# Patient Record
Sex: FEMALE | Race: WHITE | NOT HISPANIC OR LATINO | Employment: FULL TIME | URBAN - METROPOLITAN AREA
[De-identification: names, ages, dates, MRNs, and addresses within clinical notes are randomized per-mention and may not be internally consistent; named-entity substitution may affect disease eponyms.]

---

## 2017-01-05 ENCOUNTER — PATIENT MESSAGE (OUTPATIENT)
Dept: ADMINISTRATIVE | Facility: OTHER | Age: 32
End: 2017-01-05

## 2017-01-23 ENCOUNTER — TELEPHONE (OUTPATIENT)
Dept: OPHTHALMOLOGY | Facility: CLINIC | Age: 32
End: 2017-01-23

## 2017-01-23 NOTE — TELEPHONE ENCOUNTER
----- Message from Sera Patrick sent at 1/23/2017 11:35 AM CST -----  Contact: Patient  Patient called and wanted to schedule a Lasik consult with Dr. Alvarez.     Please call her about this at [phone]

## 2017-01-24 ENCOUNTER — TELEPHONE (OUTPATIENT)
Dept: INTERNAL MEDICINE | Facility: CLINIC | Age: 32
End: 2017-01-24

## 2017-01-24 NOTE — TELEPHONE ENCOUNTER
"----- Message from Ashley Gilbert sent at 1/23/2017 11:31 AM CST -----  Contact: Self  Pt is calling to Schedule an appt as a NP and to establish care.   unable to make appt due to "No Scheduling" instructions on the panel.    She can be reached at [phone].    Thank You.  "

## 2017-01-24 NOTE — TELEPHONE ENCOUNTER
Pt wanted to scheduled appt with Dr. Barraza and cancelled with Dr. Bauman/ appt scheduled per pt's preference no further questions/concerns at this time

## 2017-01-31 ENCOUNTER — PATIENT OUTREACH (OUTPATIENT)
Dept: ADMINISTRATIVE | Facility: HOSPITAL | Age: 32
End: 2017-01-31

## 2017-01-31 RX ORDER — CITALOPRAM 40 MG/1
TABLET, FILM COATED ORAL
Refills: 5 | COMMUNITY
Start: 2016-12-27 | End: 2023-05-16

## 2017-01-31 RX ORDER — VALACYCLOVIR HYDROCHLORIDE 1 G/1
TABLET, FILM COATED ORAL
Refills: 1 | COMMUNITY
Start: 2016-11-07

## 2017-02-03 ENCOUNTER — PATIENT MESSAGE (OUTPATIENT)
Dept: ADMINISTRATIVE | Facility: HOSPITAL | Age: 32
End: 2017-02-03

## 2017-02-14 ENCOUNTER — OFFICE VISIT (OUTPATIENT)
Dept: INTERNAL MEDICINE | Facility: CLINIC | Age: 32
End: 2017-02-14
Attending: INTERNAL MEDICINE
Payer: COMMERCIAL

## 2017-02-14 VITALS
HEIGHT: 68 IN | SYSTOLIC BLOOD PRESSURE: 102 MMHG | WEIGHT: 189.13 LBS | BODY MASS INDEX: 28.66 KG/M2 | OXYGEN SATURATION: 96 % | HEART RATE: 75 BPM | DIASTOLIC BLOOD PRESSURE: 82 MMHG

## 2017-02-14 DIAGNOSIS — G43.109 MIGRAINE WITH AURA AND WITHOUT STATUS MIGRAINOSUS, NOT INTRACTABLE: ICD-10-CM

## 2017-02-14 DIAGNOSIS — R06.83 SNORING: ICD-10-CM

## 2017-02-14 DIAGNOSIS — R53.83 FATIGUE, UNSPECIFIED TYPE: ICD-10-CM

## 2017-02-14 DIAGNOSIS — Z00.00 ANNUAL PHYSICAL EXAM: Primary | ICD-10-CM

## 2017-02-14 DIAGNOSIS — F33.41 RECURRENT MAJOR DEPRESSIVE DISORDER, IN PARTIAL REMISSION: ICD-10-CM

## 2017-02-14 PROCEDURE — 99999 PR PBB SHADOW E&M-EST. PATIENT-LVL IV: CPT | Mod: PBBFAC,,, | Performed by: INTERNAL MEDICINE

## 2017-02-14 PROCEDURE — 90471 IMMUNIZATION ADMIN: CPT | Mod: S$GLB,,, | Performed by: INTERNAL MEDICINE

## 2017-02-14 PROCEDURE — 99385 PREV VISIT NEW AGE 18-39: CPT | Mod: 25,S$GLB,, | Performed by: INTERNAL MEDICINE

## 2017-02-14 PROCEDURE — 90715 TDAP VACCINE 7 YRS/> IM: CPT | Mod: S$GLB,,, | Performed by: INTERNAL MEDICINE

## 2017-02-14 RX ORDER — LANOLIN ALCOHOL/MO/W.PET/CERES
400 CREAM (GRAM) TOPICAL DAILY
Refills: 0 | COMMUNITY
Start: 2017-02-14 | End: 2022-06-26

## 2017-02-14 NOTE — PROGRESS NOTES
Subjective:       Patient ID: Milvia Baker is a 31 y.o. female.    Chief Complaint: Annual Exam (est care)    HPI     Pt here to est care and for annual exam; prior pcp was at [facility]  Hx of migraine hx x 1 year - has rx for triptan Rx in past by prior pcp - this was helpful. Describes as constant pressure over ant head and behind eyes.     Pt had eval for MS in past after lost vision in right eye per pt many years ago - work up neg including MRI and lumbar puncture - was not having HA at that time.   No significant sinus congestion currently, no runny nose, no significant PND.   No worsening factors.   Alleviating factors include   Recently had annual eye exam as wears glasses - glass Rx was stable.     Taking celexa for anxiety and depression  + fatigue. + inc sleep. Feels sad at times - feels as though has been lifelong struggle and not worse. Not tearful. Attended counseling in past - reports saw psychiatrist in high school  Started on celexa a few years ago - this was inc from 20 to 40mg about 4 months ago by prior pcp  + snoring intermittently    Does have Nexplanon in place  F/u with gyn outside of [facility]    Review of Systems   Constitutional: Positive for fatigue. Negative for chills and fever.   HENT: Negative for rhinorrhea and sore throat.    Eyes: Negative for pain and visual disturbance.   Respiratory: Negative for cough and shortness of breath.    Cardiovascular: Negative for chest pain and leg swelling.   Gastrointestinal: Negative for abdominal pain and diarrhea.   Endocrine: Negative for cold intolerance and heat intolerance.   Genitourinary: Negative for dysuria and hematuria.   Musculoskeletal: Negative for arthralgias and joint swelling.   Skin: Negative for color change and rash.   Neurological: Negative for dizziness and headaches.   Hematological: Negative for adenopathy. Does not bruise/bleed easily.   Psychiatric/Behavioral: Negative for sleep disturbance. The patient is not nervous/anxious.         Objective:      Physical Exam   Constitutional: She is oriented to person, place, and time. She appears well-developed and well-nourished.   HENT:   Head: Normocephalic and atraumatic.   Right Ear: External ear normal.   Left Ear: External ear normal.   Nose: Nose normal.   Mouth/Throat: Oropharynx is clear and moist. No oropharyngeal exudate.   No carotid bruits   Eyes: Conjunctivae and EOM are normal.   Neck: Neck supple. No thyromegaly present.   Cardiovascular: Normal rate, regular rhythm, normal heart sounds and intact distal pulses.    Pulmonary/Chest: Effort normal and breath sounds normal.   Abdominal: Soft. Bowel sounds are normal.   Musculoskeletal: She exhibits no edema or tenderness.   Lymphadenopathy:     She has no cervical adenopathy.   Neurological: She is alert and oriented to person, place, and time. Coordination normal.   Skin: Skin is warm and dry.   Psychiatric: She has a normal mood and affect. Her behavior is normal. Judgment and thought content normal.       Assessment:       Annual physical exam  -     Tdap Vaccine (Adult)  -     CBC auto differential; Future; Expected date: 2/14/17  -     Comprehensive metabolic panel; Future; Expected date: 2/14/17  -     TSH; Future; Expected date: 2/14/17  -     Lipid panel; Future; Expected date: 2/14/17  -     Vitamin D; Future; Expected date: 2/14/17  -     Vitamin B12; Future; Expected date: 2/14/17  -     Folate; Future; Expected date: 2/14/17  -     Ferritin; Future; Expected date: 2/14/17  Recommend daily sunscreen, cardiovascular exercise min 30 min 5 days per week. Seatbelts routinely.    Fatigue, unspecified type: suspect multifactorial, rec graded exercise program. Refer to sleep for eval of snoring  -     Vitamin D; Future; Expected date: 2/14/17  -     Vitamin B12; Future; Expected date: 2/14/17  -     Folate; Future; Expected date: 2/14/17  -     Ferritin; Future; Expected date: 2/14/17    Migraine with aura and without status  migrainosus, not intractable: refer to neuro for eval, rec keep headache diary. Start mag supplement nightly  -     Ambulatory Referral to Neurology  -     magnesium oxide (MAG-OX) 400 mg tablet; Take 1 tablet (400 mg total) by mouth once daily.; Refill: 0    Snoring  -     Ambulatory consult to Sleep Disorders    Recurrent major depressive disorder, in partial remission: cont current med - discussed possibly switching to another ssri - will refer to counseling, start exercise program, eval by sleep for snoring and fatigue. Will rtc in 3 months for f/u - will consider med change at that time as reports no improvement with inc in current med 4 months ago  -     Ambulatory Referral to Psychology

## 2017-02-14 NOTE — PATIENT INSTRUCTIONS
Your test results will be communicated to you via: [facility], Telephone or Letter.  If you have not received your test results within one week. Please contact the clinic at [phone].    Please contact Sleep clinic to schedule your appointment at [phone]    Psychiatry department number [phone] or Dr. Kiersten Mendenhall MD [phone] and Dr. Delbert Gray MD [phone]

## 2017-02-14 NOTE — MR AVS SNAPSHOT
[facility]  [address]  [place] [address]  Phone: [phone]  Fax: [phone]                  Milvia Baker   2017 12:40 PM   Office Visit    Description:  Female : 1985   Provider:  Rach Barraza MD   Department:  [facility]           Reason for Visit     Annual Exam           Diagnoses this Visit        Comments    Preventative health care    -  Primary     Pap smear for cervical cancer screening         Fatigue, unspecified type         Annual physical exam         Migraine with aura and without status migrainosus, not intractable         Snoring         Recurrent major depressive disorder, in partial remission                To Do List           Future Appointments        Provider Department Dept Phone    2/15/2017 1:20 PM Mila Stacy NP [facility] [phone]    2017 8:15 AM LAB, [facility] [phone]      Goals (5 Years of Data)     None      Follow-Up and Disposition     Return in about 3 months (around 2017), or if symptoms worsen or fail to improve.      PURCHASE these Medications (No prescription required)        Start End    magnesium oxide (MAG-OX) 400 mg tablet 2017     Sig - Route: Take 1 tablet (400 mg total) by mouth once daily. - Oral    Class: OTC      [facility] On Call     [facility] On [facility] Nurse Care Line -  Assistance  Registered nurses in the [facility] provide clinical advisement, health education, appointment booking, and other advisory services.  Call for this free service at [phone].             Medications           Message regarding Medications     Verify the changes and/or additions to your medication regime listed below are the same as discussed with your clinician today.  If any of these changes or additions are incorrect, please notify your healthcare provider.        START taking these NEW medications        Refills    magnesium  "oxide (MAG-OX) 400 mg tablet 0    Sig: Take 1 tablet (400 mg total) by mouth once daily.    Class: OTC    Route: Oral           Verify that the below list of medications is an accurate representation of the medications you are currently taking.  If none reported, the list may be blank. If incorrect, please contact your healthcare provider. Carry this list with you in case of emergency.           Current Medications     citalopram (CELEXA) 40 MG tablet TK 1 T PO D    valacyclovir (VALTREX) 1000 MG tablet TK 2 TS PO Q 12 H PRN FOR 1 DAY FOR FEVER BLISTER    magnesium oxide (MAG-OX) 400 mg tablet Take 1 tablet (400 mg total) by mouth once daily.           Clinical Reference Information           Your Vitals Were     BP Pulse Height Weight Last Period SpO2    102/82 (BP Location: Left arm, Patient Position: Sitting, BP Method: Manual) 75 5' 8" (1.727 m) 85.8 kg (189 lb 2.5 oz) 01/28/2017 96%    BMI                28.76 kg/m2          Blood Pressure          Most Recent Value    BP  102/82      Allergies as of 2/14/2017     No Known Allergies      Immunizations Administered on Date of Encounter - 2/14/2017     Name Date Dose VIS Date Route    TDAP 2/14/2017 0.5 mL 2/24/2015 Intramuscular      Orders Placed During Today's Visit      Normal Orders This Visit    Ambulatory consult to Sleep Disorders     Ambulatory Referral to Neurology     Ambulatory Referral to Psychology     Tdap Vaccine (Adult)     Future Labs/Procedures Expected by Expires    CBC auto differential  2/14/2017 2/14/2018    Comprehensive metabolic panel  2/14/2017 4/15/2018    Ferritin  2/14/2017 5/15/2017    Folate  2/14/2017 4/15/2018    Lipid panel  2/14/2017 2/14/2018    TSH  2/14/2017 2/14/2018    Vitamin B12  2/14/2017 5/15/2017    Vitamin D  2/14/2017 5/15/2017      Instructions    Your test results will be communicated to you via: [facility], Telephone or Letter.  If you have not received your test results within one week. Please contact the clinic " at [phone].    Please contact Sleep clinic to schedule your appointment at [phone]    Psychiatry department number [phone] or Dr. Kiersten Mendenhall MD [phone] and Dr. Delbert Gray MD [phone]       Language Assistance Services     ATTENTION: Language assistance services are available, free of charge. Please call [phone].      ATENCIÓN: Si habla yanetañol, tiene a easton disposición servicios gratuitos de asistencia lingüística. Llame al [phone].     CHÚ Ý: N?u b?n nói Ti?ng Vi?t, có các d?ch v? h? tr? ngôn ng? mi?n phí dành cho b?n. G?i s? [phone].         [facility] complies with applicable Federal civil rights laws and does not discriminate on the basis of race, color, national origin, age, disability, or sex.

## 2017-02-14 NOTE — PROGRESS NOTES
Patient given TDAP IM in the LD. Patient tolerated well and Band-Aid was applied. Lot#[id number] Exp:01/28/2019. Patient advised to wait in the lobby for 15 min to make sure no adverse reactions occur. Patient given VIS information sheet.Patient states verbal understanding and has no further questions.

## 2017-02-15 ENCOUNTER — TELEPHONE (OUTPATIENT)
Dept: SLEEP MEDICINE | Facility: CLINIC | Age: 32
End: 2017-02-15

## 2017-02-15 ENCOUNTER — OFFICE VISIT (OUTPATIENT)
Dept: NEUROLOGY | Facility: CLINIC | Age: 32
End: 2017-02-15
Attending: INTERNAL MEDICINE
Payer: COMMERCIAL

## 2017-02-15 VITALS
DIASTOLIC BLOOD PRESSURE: 64 MMHG | WEIGHT: 190.06 LBS | BODY MASS INDEX: 28.8 KG/M2 | SYSTOLIC BLOOD PRESSURE: 108 MMHG | HEIGHT: 68 IN | HEART RATE: 76 BPM

## 2017-02-15 DIAGNOSIS — G47.30 UNSPECIFIED SLEEP APNEA: Primary | ICD-10-CM

## 2017-02-15 DIAGNOSIS — G43.009 MIGRAINE WITHOUT AURA, WITHOUT MENTION OF INTRACTABLE MIGRAINE WITHOUT MENTION OF STATUS MIGRAINOSUS: ICD-10-CM

## 2017-02-15 DIAGNOSIS — M54.12 CERVICAL RADICULOPATHY: ICD-10-CM

## 2017-02-15 PROBLEM — G47.00 INSOMNIA WITH SLEEP APNEA, UNSPECIFIED: Status: ACTIVE | Noted: 2017-02-15

## 2017-02-15 PROCEDURE — 99999 PR PBB SHADOW E&M-EST. PATIENT-LVL IV: CPT | Mod: PBBFAC,,, | Performed by: NURSE PRACTITIONER

## 2017-02-15 PROCEDURE — 99205 OFFICE O/P NEW HI 60 MIN: CPT | Mod: S$GLB,,, | Performed by: NURSE PRACTITIONER

## 2017-02-15 RX ORDER — TOPIRAMATE SPINKLE 15 MG/1
15 CAPSULE ORAL DAILY PRN
Qty: 90 CAPSULE | Refills: 1 | Status: SHIPPED | OUTPATIENT
Start: 2017-02-15 | End: 2023-05-16

## 2017-02-15 RX ORDER — ONDANSETRON 8 MG/1
8 TABLET, ORALLY DISINTEGRATING ORAL EVERY 12 HOURS PRN
Qty: 30 TABLET | Refills: 3 | OUTPATIENT
Start: 2017-02-15 | End: 2022-06-26

## 2017-02-15 RX ORDER — SUMATRIPTAN SUCCINATE 100 MG/1
100 TABLET ORAL
Qty: 9 TABLET | Refills: 3 | Status: SHIPPED | OUTPATIENT
Start: 2017-02-15 | End: 2023-05-16

## 2017-02-15 RX ORDER — KETOPROFEN 75 MG/1
75 CAPSULE ORAL EVERY 8 HOURS PRN
Qty: 30 CAPSULE | Refills: 3 | OUTPATIENT
Start: 2017-02-15 | End: 2022-06-26

## 2017-02-15 NOTE — MR AVS SNAPSHOT
[facility]  [address]  [place] [address]  Phone: [phone]  Fax: [phone]                  Milvia Baker   2/15/2017 1:20 PM   Office Visit    Description:  Female : 1985   Provider:  Mila Stacy NP   Department:  [facility] - Neurology           Reason for Visit     Headache     Snoring           Diagnoses this Visit        Comments    Unspecified sleep apnea    -  Primary     Migraine without aura, without mention of intractable migraine without mention of status migrainosus                To Do List           Future Appointments        Provider Department Dept Phone    2017 8:15 AM LAB, [facility] [phone]      Goals (5 Years of Data)     None       These Medications        Disp Refills Start End    ketoprofen (ORUDIS) 75 MG capsule 30 capsule 3 2/15/2017     Take 1 capsule (75 mg total) by mouth every 8 (eight) hours as needed for Pain. - Oral    Pharmacy: [facility] [address] Ph #: [phone]       ondansetron (ZOFRAN-ODT) 8 MG TbDL 30 tablet 3 2/15/2017     Take 1 tablet (8 mg total) by mouth every 12 (twelve) hours as needed. - Oral    [address] [facility] [address] Ph #: [phone]       topiramate (TOPAMAX) 15 MG capsule 90 capsule 1 2/15/2017 3/17/2017    Take 1 capsule (15 mg total) by mouth daily as needed. 1 cap qhs x 1-2 wk, then 2 caps,then 3 qhs thereafter if symptoms remain unimproved. - Oral    [address] [facility] [address] Ph #: [phone]       sumatriptan (IMITREX) 100 MG tablet 9 tablet 3 2/15/2017 3/17/2017    Take 1 tablet (100 mg total) by mouth every 2 (two) hours as needed for Migraine (max 200mg/24h). - Oral    Pharmacy: [id number] - [place] [facility] [address] [facility] [facility] [address] [facility] Ph #:  [phone]         [facility]     [facility] On Call Nurse [facility] Line - 24/7 Assistance  Registered nurses in the [facility] Center provide clinical advisement, health education, appointment booking, and other advisory services.  Call for this free service at [phone].             Medications           Message regarding Medications     Verify the changes and/or additions to your medication regime listed below are the same as discussed with your clinician today.  If any of these changes or additions are incorrect, please notify your healthcare provider.        START taking these NEW medications        Refills    ketoprofen (ORUDIS) 75 MG capsule 3    Sig: Take 1 capsule (75 mg total) by mouth every 8 (eight) hours as needed for Pain.    Class: Normal    Route: Oral    ondansetron (ZOFRAN-ODT) 8 MG TbDL 3    Sig: Take 1 tablet (8 mg total) by mouth every 12 (twelve) hours as needed.    Class: Normal    Route: Oral    topiramate (TOPAMAX) 15 MG capsule 1    Sig: Take 1 capsule (15 mg total) by mouth daily as needed. 1 cap qhs x 1-2 wk, then 2 caps,then 3 qhs thereafter if symptoms remain unimproved.    Class: Normal    Route: Oral    sumatriptan (IMITREX) 100 MG tablet 3    Sig: Take 1 tablet (100 mg total) by mouth every 2 (two) hours as needed for Migraine (max 200mg/24h).    Class: Normal    Route: Oral           Verify that the below list of medications is an accurate representation of the medications you are currently taking.  If none reported, the list may be blank. If incorrect, please contact your healthcare provider. Carry this list with you in case of emergency.           Current Medications     citalopram (CELEXA) 40 MG tablet TK 1 T PO D    etonogestrel (NEXPLANON) 68 mg Impl by Subdermal route.    magnesium oxide (MAG-OX) 400 mg tablet Take 1 tablet (400 mg total) by mouth once daily.    valacyclovir (VALTREX) 1000 MG tablet TK 2 TS PO Q 12 H PRN FOR 1 DAY FOR FEVER BLISTER    ketoprofen  "(ORUDIS) 75 MG capsule Take 1 capsule (75 mg total) by mouth every 8 (eight) hours as needed for Pain.    ondansetron (ZOFRAN-ODT) 8 MG TbDL Take 1 tablet (8 mg total) by mouth every 12 (twelve) hours as needed.    sumatriptan (IMITREX) 100 MG tablet Take 1 tablet (100 mg total) by mouth every 2 (two) hours as needed for Migraine (max 200mg/24h).    topiramate (TOPAMAX) 15 MG capsule Take 1 capsule (15 mg total) by mouth daily as needed. 1 cap qhs x 1-2 wk, then 2 caps,then 3 qhs thereafter if symptoms remain unimproved.           Clinical Reference Information           Your Vitals Were     BP Pulse Height Weight Last Period BMI    108/64 76 5' 8" (1.727 m) 86.2 kg (190 lb 0.6 oz) 01/28/2017 28.89 kg/m2      Blood Pressure          Most Recent Value    BP  108/64      Allergies as of 2/15/2017     No Known Allergies      Immunizations Administered on Date of Encounter - 2/15/2017     None      Orders Placed During Today's Visit     Future Labs/Procedures Expected by Expires    Home Sleep Studies  As directed 2/15/2018      Instructions      Obstructive Sleep Apnea  Obstructive sleep apnea is a condition that causes your air passages to become narrowed or blocked during sleep. As a result, breathing stops for short periods. Your body wakes up enough for breathing to begin again, though you don't remember it. The cycle of stopped breathing and brief awakenings can repeat dozens of times a night. This prevents the body from getting to the deeper stages of sleep that are needed for good rest.  Signs of sleep apnea include loud snoring, noisy breathing, and gasping sounds during sleep. Daytime symptoms include waking up tired after a full night's sleep, waking up with headaches, feeling very sleepy or falling asleep during the day, and having problems with memory or concentration.  Risk factors for sleep apnea include:  · Being overweight  · Being a man, or a woman in menopause  · Smoking  · Using alcohol or sedating " medications or herbs  · Having enlarged structures in the nose or throat  Home care  Lifestyle changes that can help treat snoring and sleep apnea include the following:  · If you are overweight, lose weight. Talk to your healthcare provider about a weight-loss plan for you.  · Avoid alcohol for 3 to 4 hours before bedtime. Avoid sedating medications. Ask your healthcare provider about the medications you take.  · If you smoke, talk to your healthcare provider about ways to quit.  · Sleep on your side. This can help prevent gravity from pulling relaxed throat tissues into your breathing passages.  · If you have allergies or sinus problems that block your nose, ask your healthcare provider for help.  Follow up  Follow up with your healthcare provider as advised. A diagnosis of sleep apnea is made with a sleep study. Your healthcare provider can tell you more about this test.  When to seek medical care  Sleep apnea can make you more likely to have certain health problems. These include high blood pressure, heart attack, stroke, and sexual dysfunction. If you have sleep apnea, talk to your healthcare provider about the best treatments for you.  Date Last Reviewed: 5/3/2015  © 9329-0953 [employer]. [address] [place] [address]. All rights reserved. This information is not intended as a substitute for professional medical care. Always follow your healthcare professional's instructions.        Preventing Migraine Headaches: Triggers  The first step in preventing migraines is to learn what triggers them. You may then be able to control your triggers to avoid or reduce the severity of your migraines.     Know your triggers  Be aware that you may have more than one trigger, and that some triggers may work together. Common migraine triggers include:  · Food and nutrition. Skipping meals or not drinking enough water can trigger headaches. So can certain foods, such as caffeine, monosodium glutamate  (MSG), aged cheese, or sausage.  · Alcohol. Red wine and other alcoholic beverages are common migraine triggers.  · Chemicals. Scents, cleaning products, gasoline, glue, perfume, and paint can be triggers. So can tobacco smoke, including secondhand smoke.  · Emotions. Stress can trigger headaches or make them worse once they begin.  · Sleep disruption. Staying up late, sleeping late, and traveling across time zones can disrupt your sleep cycle, triggering headaches.  · Hormones. Many women notice that migraines tend to happen at a certain point in their menstrual cycle. Birth control pills or hormone replacement therapy may also trigger migraines.  · Environment and weather. Air travel, changes in altitude, air pressure changes, hot sun, or bright or flashing lights can be triggers.    Control your triggers  These are some of the things you can do to try to control triggers:  · Avoid triggers if you can. For example, stay clear of alcohol and foods that trigger your headaches. Use unscented household products. Keep regular sleep habits. Manage stress to help control emotional triggers.  · Change your behavior at times when triggers can't be avoided. For example, make sure to get enough rest and drink plenty of water while you're traveling. Make sure to carry a hat, sunglasses, and your medicines. Be alert for migraine symptoms, so you can treat a migraine early if it happens.  Date Last Reviewed: 10/9/2015  © 4344-6597 [employer]. [address] [place] [address], [place] [address]. All rights reserved. This information is not intended as a substitute for professional medical care. Always follow your healthcare professional's instructions.        Preventing Migraine Headaches: Medicines and Lifestyle Changes  A migraine is a type of severe headache. Having a migraine can be very painful. But there are steps you can take to help prevent migraines.    Medicines to help prevent migraines  · Your healthcare  provider may prescribe certain medicines to help prevent migraines. These medicines may need to be taken daily. Or they may only need to be taken at times when youre likely to have a migraine.  · Common medicines used to help prevent migraines include:  ¨ Triptans (serotonin receptor agonists)  ¨ Nonsteroidal anti-inflammatory drugs (available over-the-counter)  ¨ Beta-blockers  ¨ Anticonvulsants  ¨ Tricyclic antidepressants  ¨ Calcium channel blockers  ¨ Certain vitamins, minerals, and plant extracts  ¨ Botulinum toxin injection (Botox) for certain chronic migraines   ¨ CGRP (calcitonin gene-related peptide) agnonists are being reviewed by the Food and Drug Administration (FDA)  Lifestyle changes for long-term prevention  Here are some suggestions:  · Exercise. Regular exercise can help prevent migraines and improve your health. (If exercise triggers your migraines, talk to your healthcare provider.)  · Keep regular habits. Dont skip or delay meals. Drink plenty of water. And go to bed and get up at about the same time each day. This includes weekends.  · Try alternative treatments. These are treatments that do not involve the use of medicines or surgery. They may help relieve symptoms and prevent migraines. Some treatment options include biofeedback and acupuncture. Ask your healthcare provider to tell you more about these treatments if you have questions.  · Limit caffeine. You may find that caffeine helps relieve pain during an attack. But too much caffeine can also trigger migraines. So, limit the amount of caffeine you consume.  Date Last Reviewed: 10/11/2015  © 6387-8145 [employer]. [address] [place] [address], [place] [address]. All rights reserved. This information is not intended as a substitute for professional medical care. Always follow your healthcare professional's instructions.        What Are Migraine and Tension Headaches?  Although there are several types of headaches, migraine and  tension headaches affect the most people. When you have a headache, it isn't your brain that's hurting. Your head aches because nerves in the bones, blood vessels, meninges, and muscles of your head are irritated. These irritated nerves send pain signals to the brain, which identifies where you hurt and how bad the pain is.  Talk with your healthcare provider about a treatment plan that may help relieve pain and prevent future headaches.     What causes your headache?  The actual headache process is not yet understood. Only rarely are headaches a sign of a serious medical problem. Headache pain may be caused by abnormal interaction between the brain and the nerves and blood vessels in the head. Environmental stresses or certain foods and drinks may trigger headache pain.  What is referred pain?  Headache pain can be referred pain, which is pain that has its source in one place but is felt in another. For example, pain behind the eyes may actually be caused by tense muscles in the neck and shoulders. This means that the place that hurts may not be the part of the body that needs treatment.  Is it a migraine?  Migraine is a vascular headache that causes throbbing pain felt on one or both sides of the head. You may feel nauseated or vomit. This headache may also be preceded or associated with changes in sight (like seeing spots or flashes of light), ability to speak, or sensation (aura). There are a wide variety of environmental and food-related triggers for migraines. The pain may last for 4 to 72 hours. Afterward, you may feel shaky for a day or so. If this is the first time you experience these symptoms, you should immediately seek medical attention because you could be having a stroke.  Is it a tension headache?  This type of headache is usually a dull ache or a sensation of pressure on both sides of the head. It may be associated with pain or tension in the neck and shoulders. Depression, anxiety, and stress can  "cause a tension headache. The pain may not have a definite beginning or end. It may come and go, or seem never to go away.  When to call the healthcare provider  Call your healthcare provider for headaches that happen along with any of these symptoms:  · Sudden, severe headache that is different from your usual headache pain  · High fever along with a stiff neck  · Recurring headache in children   · Ongoing numbness or muscle weakness  · Loss of vision  · Pain following a head injury  · Convulsions, or a change in mental awareness  · A headache you would call "the worst headache you've ever had"   Date Last Reviewed: 9/14/2015 © 2000-2016 [employer]. [address] [place] [address], [place] [address]. All rights reserved. This information is not intended as a substitute for professional medical care. Always follow your healthcare professional's instructions.        Migraine Headache: Stages and Treatment  A migraine headache tends to progress in stages. Learning these stages can help you better understand what is happening. Then you can learn ways to reduce pain and relieve other symptoms. Methods for relieving your symptoms include self-care and medicines.  Migraine stages  Migraines tend to progress through 4 stages. Many people don't have all stages, and stages may differ with each headache:  · Prodrome. A few hours to a day or so before the headache, you may feel tired, (yawning many times), uneasy, or lal. You may also feel bloated or crave certain foods.  · Aura. Up to an hour before the headache starts, some migraine sufferers experience aura--flashing lights, blind spots, other vision problems, confusion, difficulty speaking, or other neurologic symptoms.  · Headache. Moderate to severe pain affects one side of the head and then can spread to both sides, often along with nausea. You may be highly sensitive to light, sound, and odors. Vomiting or diarrhea may also happen. This stage lasts 4 to 72 " "hours.  · Postdrome. After your headache ends, you may feel tired, achy, and "washed out." This may last for a day or so.    Self-care during a migraine  Here is what you can do:  · Use a cold compress. Wrap a thin cloth around a cold pack, a cold can of soda, or a bag of frozen vegetables. Apply this to your temple or other pain site.  · Drink fluids. If nausea makes it hard to drink, try sucking on ice.  · Rest. If possible, lie down. Try not to bend over, as this may increase your pain. Sometimes laying in a dark quiet room can help the migraine from being aggravated.    · Try caffeine. Some people find that drinking fluids with caffeine, such as coffee or tea, helps to lessen migraine pain.  Using medicines  Work with your healthcare provider to find the right medicines for you. Medicines for migraine may relieve pain (analgesics), relieve nausea, or attack the migraine's root causes (migraine-specific medicines).  Rebound headache  Taking analgesics each day, or even several times a week, may lead to more frequent and severe headaches. These are called rebound headaches. If you think you're having rebound headaches, tell your healthcare provider. He or she can help you safely decrease your medicine. Rebound caffeine withdrawal headaches can also happen.    Date Last Reviewed: 10/9/2015  © 0367-9473 [employer]. [address] [place] [address], [place] [address]. All rights reserved. This information is not intended as a substitute for professional medical care. Always follow your healthcare professional's instructions.      Sarah or Favian will contact you to schedule your sleep study. Their number is [phone] (ext 1). The [facility] is located on 7th floor of the [facility].    We will call you when the sleep study results are ready - if you have not heard from us by 2 weeks from the date of the study, please call [phone] (ext 2).    You are advised to abstain from driving should you " feel sleepy or drowsy.         Language Assistance Services     ATTENTION: Language assistance services are available, free of charge. Please call [phone].      ATENCIÓN: Si habla jones, tiene a easton disposición servicios gratuitos de asistencia lingüística. Llame al [phone].     CHÚ Ý: N?u b?n nói Ti?ng Vi?t, có các d?ch v? h? tr? ngôn ng? mi?n phí dành cho b?n. G?i s? [phone].         [facility] complies with applicable Federal civil rights laws and does not discriminate on the basis of race, color, national origin, age, disability, or sex.

## 2017-02-15 NOTE — LETTER
February 15, 2017      Rach Barraza MD  [address]  [place] [address]           [facility]  [address]  [place] [address]  Phone: [phone]  Fax: [phone]          Patient: Milvia Baker   MR Number: [id number]   YOB: 1985   Date of Visit: 2/15/2017       Dear Dr. Rach Barraza:    Thank you for referring Milvia Baker to me for evaluation. Attached you will find relevant portions of my assessment and plan of care.    If you have questions, please do not hesitate to call me. I look forward to following Milvia Baker along with you.    Sincerely,    Mila Stacy, NP    Enclosure  CC:  No Recipients    If you would like to receive this communication electronically, please contact [email or web address] or [phone] to request more information on [employer] Link access.    For providers and/or their staff who would like to refer a patient to [facility], please contact us through our one-stop-shop provider referral line, [facility], at [phone].    If you feel you have received this communication in error or would no longer like to receive these types of communications, please e-mail [email or web address]

## 2017-02-15 NOTE — PATIENT INSTRUCTIONS
Obstructive Sleep Apnea  Obstructive sleep apnea is a condition that causes your air passages to become narrowed or blocked during sleep. As a result, breathing stops for short periods. Your body wakes up enough for breathing to begin again, though you don't remember it. The cycle of stopped breathing and brief awakenings can repeat dozens of times a night. This prevents the body from getting to the deeper stages of sleep that are needed for good rest.  Signs of sleep apnea include loud snoring, noisy breathing, and gasping sounds during sleep. Daytime symptoms include waking up tired after a full night's sleep, waking up with headaches, feeling very sleepy or falling asleep during the day, and having problems with memory or concentration.  Risk factors for sleep apnea include:  · Being overweight  · Being a man, or a woman in menopause  · Smoking  · Using alcohol or sedating medications or herbs  · Having enlarged structures in the nose or throat  Home care  Lifestyle changes that can help treat snoring and sleep apnea include the following:  · If you are overweight, lose weight. Talk to your healthcare provider about a weight-loss plan for you.  · Avoid alcohol for 3 to 4 hours before bedtime. Avoid sedating medications. Ask your healthcare provider about the medications you take.  · If you smoke, talk to your healthcare provider about ways to quit.  · Sleep on your side. This can help prevent gravity from pulling relaxed throat tissues into your breathing passages.  · If you have allergies or sinus problems that block your nose, ask your healthcare provider for help.  Follow up  Follow up with your healthcare provider as advised. A diagnosis of sleep apnea is made with a sleep study. Your healthcare provider can tell you more about this test.  When to seek medical care  Sleep apnea can make you more likely to have certain health problems. These include high blood pressure, heart attack, stroke, and sexual  dysfunction. If you have sleep apnea, talk to your healthcare provider about the best treatments for you.  Date Last Reviewed: 5/3/2015  © 6473-1694 [employer]. [address] [place] [address], [place] [address]. All rights reserved. This information is not intended as a substitute for professional medical care. Always follow your healthcare professional's instructions.        Preventing Migraine Headaches: Triggers  The first step in preventing migraines is to learn what triggers them. You may then be able to control your triggers to avoid or reduce the severity of your migraines.     Know your triggers  Be aware that you may have more than one trigger, and that some triggers may work together. Common migraine triggers include:  · Food and nutrition. Skipping meals or not drinking enough water can trigger headaches. So can certain foods, such as caffeine, monosodium glutamate (MSG), aged cheese, or sausage.  · Alcohol. Red wine and other alcoholic beverages are common migraine triggers.  · Chemicals. Scents, cleaning products, gasoline, glue, perfume, and paint can be triggers. So can tobacco smoke, including secondhand smoke.  · Emotions. Stress can trigger headaches or make them worse once they begin.  · Sleep disruption. Staying up late, sleeping late, and traveling across time zones can disrupt your sleep cycle, triggering headaches.  · Hormones. Many women notice that migraines tend to happen at a certain point in their menstrual cycle. Birth control pills or hormone replacement therapy may also trigger migraines.  · Environment and weather. Air travel, changes in altitude, air pressure changes, hot sun, or bright or flashing lights can be triggers.    Control your triggers  These are some of the things you can do to try to control triggers:  · Avoid triggers if you can. For example, stay clear of alcohol and foods that trigger your headaches. Use unscented household products. Keep regular sleep habits.  Manage stress to help control emotional triggers.  · Change your behavior at times when triggers can't be avoided. For example, make sure to get enough rest and drink plenty of water while you're traveling. Make sure to carry a hat, sunglasses, and your medicines. Be alert for migraine symptoms, so you can treat a migraine early if it happens.  Date Last Reviewed: 10/9/2015  © 9291-1623 [employer]. [address] [place] [address], [place] [address]. All rights reserved. This information is not intended as a substitute for professional medical care. Always follow your healthcare professional's instructions.        Preventing Migraine Headaches: Medicines and Lifestyle Changes  A migraine is a type of severe headache. Having a migraine can be very painful. But there are steps you can take to help prevent migraines.    Medicines to help prevent migraines  · Your healthcare provider may prescribe certain medicines to help prevent migraines. These medicines may need to be taken daily. Or they may only need to be taken at times when youre likely to have a migraine.  · Common medicines used to help prevent migraines include:  ¨ Triptans (serotonin receptor agonists)  ¨ Nonsteroidal anti-inflammatory drugs (available over-the-counter)  ¨ Beta-blockers  ¨ Anticonvulsants  ¨ Tricyclic antidepressants  ¨ Calcium channel blockers  ¨ Certain vitamins, minerals, and plant extracts  ¨ Botulinum toxin injection (Botox) for certain chronic migraines   ¨ CGRP (calcitonin gene-related peptide) agnonists are being reviewed by the Food and Drug Administration (FDA)  Lifestyle changes for long-term prevention  Here are some suggestions:  · Exercise. Regular exercise can help prevent migraines and improve your health. (If exercise triggers your migraines, talk to your healthcare provider.)  · Keep regular habits. Dont skip or delay meals. Drink plenty of water. And go to bed and get up at about the same time each day. This  includes weekends.  · Try alternative treatments. These are treatments that do not involve the use of medicines or surgery. They may help relieve symptoms and prevent migraines. Some treatment options include biofeedback and acupuncture. Ask your healthcare provider to tell you more about these treatments if you have questions.  · Limit caffeine. You may find that caffeine helps relieve pain during an attack. But too much caffeine can also trigger migraines. So, limit the amount of caffeine you consume.  Date Last Reviewed: 10/11/2015  © 8265-6151 [employer]. [address] [place] [address], [place] [address]. All rights reserved. This information is not intended as a substitute for professional medical care. Always follow your healthcare professional's instructions.        What Are Migraine and Tension Headaches?  Although there are several types of headaches, migraine and tension headaches affect the most people. When you have a headache, it isn't your brain that's hurting. Your head aches because nerves in the bones, blood vessels, meninges, and muscles of your head are irritated. These irritated nerves send pain signals to the brain, which identifies where you hurt and how bad the pain is.  Talk with your healthcare provider about a treatment plan that may help relieve pain and prevent future headaches.     What causes your headache?  The actual headache process is not yet understood. Only rarely are headaches a sign of a serious medical problem. Headache pain may be caused by abnormal interaction between the brain and the nerves and blood vessels in the head. Environmental stresses or certain foods and drinks may trigger headache pain.  What is referred pain?  Headache pain can be referred pain, which is pain that has its source in one place but is felt in another. For example, pain behind the eyes may actually be caused by tense muscles in the neck and shoulders. This means that the place that hurts may  "not be the part of the body that needs treatment.  Is it a migraine?  Migraine is a vascular headache that causes throbbing pain felt on one or both sides of the head. You may feel nauseated or vomit. This headache may also be preceded or associated with changes in sight (like seeing spots or flashes of light), ability to speak, or sensation (aura). There are a wide variety of environmental and food-related triggers for migraines. The pain may last for 4 to 72 hours. Afterward, you may feel shaky for a day or so. If this is the first time you experience these symptoms, you should immediately seek medical attention because you could be having a stroke.  Is it a tension headache?  This type of headache is usually a dull ache or a sensation of pressure on both sides of the head. It may be associated with pain or tension in the neck and shoulders. Depression, anxiety, and stress can cause a tension headache. The pain may not have a definite beginning or end. It may come and go, or seem never to go away.  When to call the healthcare provider  Call your healthcare provider for headaches that happen along with any of these symptoms:  · Sudden, severe headache that is different from your usual headache pain  · High fever along with a stiff neck  · Recurring headache in children   · Ongoing numbness or muscle weakness  · Loss of vision  · Pain following a head injury  · Convulsions, or a change in mental awareness  · A headache you would call "the worst headache you've ever had"   Date Last Reviewed: 9/14/2015  © 5319-7839 [employer]. [address] [place] [address], [place] [address]. All rights reserved. This information is not intended as a substitute for professional medical care. Always follow your healthcare professional's instructions.        Migraine Headache: Stages and Treatment  A migraine headache tends to progress in stages. Learning these stages can help you better understand what is happening. Then you " "can learn ways to reduce pain and relieve other symptoms. Methods for relieving your symptoms include self-care and medicines.  Migraine stages  Migraines tend to progress through 4 stages. Many people don't have all stages, and stages may differ with each headache:  · Prodrome. A few hours to a day or so before the headache, you may feel tired, (yawning many times), uneasy, or lal. You may also feel bloated or crave certain foods.  · Aura. Up to an hour before the headache starts, some migraine sufferers experience aura--flashing lights, blind spots, other vision problems, confusion, difficulty speaking, or other neurologic symptoms.  · Headache. Moderate to severe pain affects one side of the head and then can spread to both sides, often along with nausea. You may be highly sensitive to light, sound, and odors. Vomiting or diarrhea may also happen. This stage lasts 4 to 72 hours.  · Postdrome. After your headache ends, you may feel tired, achy, and "washed out." This may last for a day or so.    Self-care during a migraine  Here is what you can do:  · Use a cold compress. Wrap a thin cloth around a cold pack, a cold can of soda, or a bag of frozen vegetables. Apply this to your temple or other pain site.  · Drink fluids. If nausea makes it hard to drink, try sucking on ice.  · Rest. If possible, lie down. Try not to bend over, as this may increase your pain. Sometimes laying in a dark quiet room can help the migraine from being aggravated.    · Try caffeine. Some people find that drinking fluids with caffeine, such as coffee or tea, helps to lessen migraine pain.  Using medicines  Work with your healthcare provider to find the right medicines for you. Medicines for migraine may relieve pain (analgesics), relieve nausea, or attack the migraine's root causes (migraine-specific medicines).  Rebound headache  Taking analgesics each day, or even several times a week, may lead to more frequent and severe headaches. " These are called rebound headaches. If you think you're having rebound headaches, tell your healthcare provider. He or she can help you safely decrease your medicine. Rebound caffeine withdrawal headaches can also happen.    Date Last Reviewed: 10/9/2015  © 2483-2548 [employer]. [address] [place] [address], [place] [address]. All rights reserved. This information is not intended as a substitute for professional medical care. Always follow your healthcare professional's instructions.      Sarah or Favian will contact you to schedule your sleep study. Their number is [phone] (ext 1). The [facility] is located on 7th floor of the [facility].    We will call you when the sleep study results are ready - if you have not heard from us by 2 weeks from the date of the study, please call [phone] (ext 2).    You are advised to abstain from driving should you feel sleepy or drowsy.

## 2017-02-15 NOTE — PROGRESS NOTES
"REFERRING PROVIDER: Dr. Rach Barraza    REASON FOR CONSULT: Headaches and Snoring    31/F with headaches and sleep disturbance. In  2009 she lost vision OD few min, took a yr to see neuro, recurred few times since then, nothing recent. MS workup was negative. Does not recall HA following episodes.  Began with headache 1 yr ago, describes as "daily, tension throughout head" posteriorly, tender to touch, eyes always feel droopy or heavy w or w/o headache. Reports as stabbing pain if more intense, + associated nausea, dizziness, imbalance. Continues to work. Denies photo/phonophobia, recent fever/chills, head injury, acute limb weakness. ~30-45min it lessens intensity w/o medication, just by ceasing work or rest. Sometimes takes Ibuprofen 3-4 tab which is helpful.  If pain happens at home on weekend, she lies around on couch or in bed. +anxiety "maybe I am creating all of this". Always nauseated. Some headaches are mild and others can linger days. Imitrex 100mg helps abort pain w/i 4hr (has not refilled since fall '16). Stress, prolonged computer use, bright lights are known triggers.  Activity can worsen pain, rest improve. Sees orbs of light/squiggly lines during headache, not preceding. Occasional ringing in ears or fullness. Takes NSAID once weekly, otherwise just tolerates headache.     +snoring, denies witnessed apneic pauses, wakes self air gasping or from snoring, 0-1x nocturia, un-refreshed from sleep "struggle to get up", daytime sleepiness "recently told by family member that she is always sleeping". She looks forward to sleep time. Sleeps on back and side.     Sporadic shooting pain left shoulder to fingers, occurs few times/month, 5-20s. Had attended PT in past.    Tried: Imitrex  Ophthalmology exam normal Nov '16  Claritin or benadryl prn for seasonal allergies/sinus    FH: Adopted so unknown  SH: Religious Adm, engaged no tobacco, social ETOH    HIT-6 score-62  ESS = ___    LABS pending " "2/16/17____    ROS: Denies double vision, frequent sinus congestion. +teeth grinding (seeing DDS 2/20), 45# gain/ 4 yrs, low mood/anxiety (SSRI), otherwise a balance review of 10-systems is negative.       PHYSICAL EXAM:   General: W/D, W/N, well groomed  Visit Vitals    /64    Pulse 76    Ht 5' 8" (1.727 m)    Wt 86.2 kg (190 lb 0.6 oz)    LMP 01/28/2017    BMI 28.89 kg/m2     Neurological: Awake, alert, oriented x 3. Speech fluent, no dysarthria. Good attention span. Good fund of knowledge.   CN II-XII- pupils equal, no ptosis, nystagmus, EOM palsy. No facial asymmetry or facial sensory loss. Good hearing bilaterally. Good shoulder shrug, palatal elevation, tongue protrusion bilaterally.   Motor: 5/5 strength, normal tone/bulk in all extremities.   Sensory: Intact to light touch upper and lower extremities. +bilateral temporal, occipital, trapezius and paracervical muscle tenderness.   Gait: Normal   Coordination: Good FTNT, Heel to shin   Modified mallampati II, long thick tongue coated/patchy erythema, normal palate, neck circumference 13.5"      IMPRESSION:   Chronic migraines w/o aura  Unspecified sleep apnea  Hx brief monocular vision loss w/o headache  Cervical radiculopathy    PLAN   Topamax 15mg qhs, with up-titration to 45mg qhs for improvement of headaches. Stay at lowest effective dose. Discussed purpose/se    Abortive therapy:   Resume Imitrex 100mg 1/2-1 tab PO q2h prn, max 200mg/24h, +- NSAID for improved effectiveness. Discussed potential s/e/purpose.   Ketoprofen 75mg PO q8h prn  Zofran ODT 8mg q12h prn nausea   Stop otc analgesics    MRI brain/cervical spine    Encouraged to continue to eat regular meals, get adequate sleep, avoid known triggers, and reduce stressors. Consider changing fluorescent lights in office/glare free screen    Home sleep test, discussed plan of care (plan myochnser). Discussed etiology of SUZETTE, potential implications of untreated sleep apnea.       RTC 2.5 " month, sooner if needed. Begin HA diaries for accurate monitoring (will do electronic)

## 2017-02-16 ENCOUNTER — HOSPITAL ENCOUNTER (OUTPATIENT)
Dept: RADIOLOGY | Facility: OTHER | Age: 32
Discharge: HOME | End: 2017-02-16
Attending: NURSE PRACTITIONER
Payer: COMMERCIAL

## 2017-02-16 DIAGNOSIS — G43.009 MIGRAINE WITHOUT AURA, WITHOUT MENTION OF INTRACTABLE MIGRAINE WITHOUT MENTION OF STATUS MIGRAINOSUS: ICD-10-CM

## 2017-02-16 PROCEDURE — 70553 MRI BRAIN STEM W/O & W/DYE: CPT | Mod: 26,,, | Performed by: RADIOLOGY

## 2017-02-16 PROCEDURE — A9585 GADOBUTROL INJECTION: HCPCS | Performed by: NURSE PRACTITIONER

## 2017-02-16 PROCEDURE — 70553 MRI BRAIN STEM W/O & W/DYE: CPT | Mod: TC

## 2017-02-16 PROCEDURE — 25500020 PHARM REV CODE 255: Performed by: NURSE PRACTITIONER

## 2017-02-16 RX ORDER — GADOBUTROL 604.72 MG/ML
8.5 INJECTION INTRAVENOUS
Status: COMPLETED | OUTPATIENT
Start: 2017-02-16 | End: 2017-02-16

## 2017-02-16 RX ADMIN — GADOBUTROL 8.5 ML: 604.72 INJECTION INTRAVENOUS at 09:02

## 2017-02-17 ENCOUNTER — PATIENT MESSAGE (OUTPATIENT)
Dept: NEUROLOGY | Facility: CLINIC | Age: 32
End: 2017-02-17

## 2017-02-18 ENCOUNTER — TELEPHONE (OUTPATIENT)
Dept: INTERNAL MEDICINE | Facility: CLINIC | Age: 32
End: 2017-02-18

## 2017-02-18 DIAGNOSIS — E55.9 VITAMIN D DEFICIENCY DISEASE: Primary | ICD-10-CM

## 2017-02-18 RX ORDER — ERGOCALCIFEROL 1.25 MG/1
50000 CAPSULE ORAL
Qty: 12 CAPSULE | Refills: 0 | Status: SHIPPED | OUTPATIENT
Start: 2017-02-18 | End: 2017-05-07

## 2017-02-18 NOTE — TELEPHONE ENCOUNTER
Message sent to pt via my chart with lab results and updates to plan.    please schedule vit d lab in 3 months

## 2017-02-24 ENCOUNTER — TELEPHONE (OUTPATIENT)
Dept: NEUROLOGY | Facility: CLINIC | Age: 32
End: 2017-02-24

## 2017-02-24 NOTE — TELEPHONE ENCOUNTER
This medication was sent to [facility] on 2/15. Called patient who states she had to change pharmacy for insurance reasons    Called [facility] and they were able to process the Rx #18 for 30 days. Called pt and advised

## 2017-02-24 NOTE — TELEPHONE ENCOUNTER
----- Message from Maida Harper sent at 2/24/2017  2:16 PM CST -----  [facility] called they need to change the quantity change on the medication sumatriptan (IMITREX) 100 MG tablet 9 tablet it needs to be 18 tablets

## 2017-03-03 ENCOUNTER — TELEPHONE (OUTPATIENT)
Dept: SLEEP MEDICINE | Facility: OTHER | Age: 32
End: 2017-03-03

## 2017-03-07 ENCOUNTER — TELEPHONE (OUTPATIENT)
Dept: SLEEP MEDICINE | Facility: OTHER | Age: 32
End: 2017-03-07

## 2017-03-13 ENCOUNTER — TELEPHONE (OUTPATIENT)
Dept: SLEEP MEDICINE | Facility: OTHER | Age: 32
End: 2017-03-13

## 2017-03-13 ENCOUNTER — PATIENT MESSAGE (OUTPATIENT)
Dept: ADMINISTRATIVE | Facility: OTHER | Age: 32
End: 2017-03-13

## 2017-03-13 NOTE — TELEPHONE ENCOUNTER
Left a couple messages to schedule her home sleep study.  No response,sending out a message through my [facility] to schedule.

## 2017-03-15 ENCOUNTER — TELEPHONE (OUTPATIENT)
Dept: SLEEP MEDICINE | Facility: CLINIC | Age: 32
End: 2017-03-15

## 2017-03-15 NOTE — TELEPHONE ENCOUNTER
Scheduled pt for new pt appt but pt no longer has insurance coverage. Pt will reschedule once she is covered again.

## 2017-03-17 ENCOUNTER — TELEPHONE (OUTPATIENT)
Dept: SLEEP MEDICINE | Facility: OTHER | Age: 32
End: 2017-03-17

## 2017-03-22 ENCOUNTER — PATIENT MESSAGE (OUTPATIENT)
Dept: ADMINISTRATIVE | Facility: OTHER | Age: 32
End: 2017-03-22

## 2017-03-22 ENCOUNTER — TELEPHONE (OUTPATIENT)
Dept: SLEEP MEDICINE | Facility: OTHER | Age: 32
End: 2017-03-22

## 2017-03-22 NOTE — TELEPHONE ENCOUNTER
Last time we talked to patient to schedule her home sleep study.  She told us she would call to schedule when she is able.  I sent a reminder message through my [facility] to schedule.

## 2017-03-28 ENCOUNTER — TELEPHONE (OUTPATIENT)
Dept: SLEEP MEDICINE | Facility: OTHER | Age: 32
End: 2017-03-28

## 2017-04-03 ENCOUNTER — TELEPHONE (OUTPATIENT)
Dept: SLEEP MEDICINE | Facility: OTHER | Age: 32
End: 2017-04-03

## 2017-04-03 NOTE — TELEPHONE ENCOUNTER
Left several messages and sent a message through my [facility] to schedule her home sleep study.  No response.

## 2022-01-12 ENCOUNTER — TELEPHONE (OUTPATIENT)
Dept: INTERNAL MEDICINE | Facility: CLINIC | Age: 37
End: 2022-01-12
Payer: COMMERCIAL

## 2022-01-12 NOTE — TELEPHONE ENCOUNTER
----- Message from Victor Manuel Cox sent at 1/12/2022 11:42 AM CST -----  Contact: [phone]  Request Appt    Who Called: Lucy cMgowan Type: [facility]  Call Back Number:[phone]  Additional Information: Pt would like to schedule with Dr. Correia within a month

## 2022-01-12 NOTE — TELEPHONE ENCOUNTER
Left message informing her of  message.     June Correia MD   Physician   Specialty:  Internal Medicine   Telephone Encounter   Signed   Encounter Date:  1/12/2022                                Not taking new patients.  Please book her with a provider who can see her within a month

## 2022-06-26 ENCOUNTER — HOSPITAL ENCOUNTER (EMERGENCY)
Facility: HOSPITAL | Age: 37
Discharge: HOME | End: 2022-06-26
Attending: EMERGENCY MEDICINE
Payer: COMMERCIAL

## 2022-06-26 VITALS
OXYGEN SATURATION: 98 % | HEIGHT: 68 IN | WEIGHT: 165 LBS | DIASTOLIC BLOOD PRESSURE: 72 MMHG | BODY MASS INDEX: 25.01 KG/M2 | HEART RATE: 80 BPM | RESPIRATION RATE: 18 BRPM | TEMPERATURE: 99 F | SYSTOLIC BLOOD PRESSURE: 129 MMHG

## 2022-06-26 DIAGNOSIS — J30.9 ALLERGIC RHINITIS, UNSPECIFIED SEASONALITY, UNSPECIFIED TRIGGER: ICD-10-CM

## 2022-06-26 DIAGNOSIS — J06.9 VIRAL URI WITH COUGH: ICD-10-CM

## 2022-06-26 DIAGNOSIS — J20.9 ACUTE BRONCHITIS, UNSPECIFIED ORGANISM: Primary | ICD-10-CM

## 2022-06-26 LAB
CTP QC/QA: YES
INFLUENZA A ANTIGEN, POC: NEGATIVE
INFLUENZA B ANTIGEN, POC: NEGATIVE
POC RAPID STREP A: NEGATIVE
SARS-COV-2 RDRP RESP QL NAA+PROBE: NEGATIVE

## 2022-06-26 PROCEDURE — 96372 THER/PROPH/DIAG INJ SC/IM: CPT | Performed by: NURSE PRACTITIONER

## 2022-06-26 PROCEDURE — U0002 COVID-19 LAB TEST NON-CDC: HCPCS | Mod: ER | Performed by: NURSE PRACTITIONER

## 2022-06-26 PROCEDURE — 63600175 PHARM REV CODE 636 W HCPCS: Mod: ER | Performed by: NURSE PRACTITIONER

## 2022-06-26 PROCEDURE — 87804 INFLUENZA ASSAY W/OPTIC: CPT | Mod: 59,ER

## 2022-06-26 PROCEDURE — 99284 EMERGENCY DEPT VISIT MOD MDM: CPT | Mod: 25,ER

## 2022-06-26 RX ORDER — BENZONATATE 200 MG/1
400 CAPSULE ORAL EVERY 8 HOURS PRN
COMMUNITY
Start: 2022-06-21 | End: 2023-05-16

## 2022-06-26 RX ORDER — AZITHROMYCIN 250 MG/1
250 TABLET, FILM COATED ORAL DAILY
Qty: 6 TABLET | Refills: 0 | Status: SHIPPED | OUTPATIENT
Start: 2022-06-26 | End: 2022-07-01

## 2022-06-26 RX ORDER — DEXTROMETHORPHAN HYDROBROMIDE, GUAIFENESIN 5; 100 MG/5ML; MG/5ML
650 LIQUID ORAL EVERY 8 HOURS
Qty: 20 TABLET | Refills: 0 | Status: SHIPPED | OUTPATIENT
Start: 2022-06-26 | End: 2022-07-01

## 2022-06-26 RX ORDER — ALBUTEROL SULFATE 90 UG/1
AEROSOL, METERED RESPIRATORY (INHALATION)
COMMUNITY
Start: 2022-06-21 | End: 2023-05-16

## 2022-06-26 RX ORDER — AZELASTINE 1 MG/ML
1 SPRAY, METERED NASAL 2 TIMES DAILY
Qty: 30 ML | Refills: 0 | Status: SHIPPED | OUTPATIENT
Start: 2022-06-26 | End: 2023-05-21

## 2022-06-26 RX ORDER — PROMETHAZINE HYDROCHLORIDE AND DEXTROMETHORPHAN HYDROBROMIDE 6.25; 15 MG/5ML; MG/5ML
5 SYRUP ORAL
COMMUNITY
Start: 2022-06-21 | End: 2023-05-16

## 2022-06-26 RX ORDER — DEXAMETHASONE SODIUM PHOSPHATE 4 MG/ML
8 INJECTION, SOLUTION INTRA-ARTICULAR; INTRALESIONAL; INTRAMUSCULAR; INTRAVENOUS; SOFT TISSUE
Status: COMPLETED | OUTPATIENT
Start: 2022-06-26 | End: 2022-06-26

## 2022-06-26 RX ORDER — CETIRIZINE HYDROCHLORIDE 10 MG/1
10 TABLET ORAL DAILY
COMMUNITY
Start: 2022-06-21 | End: 2022-10-25 | Stop reason: ALTCHOICE

## 2022-06-26 RX ORDER — IBUPROFEN 600 MG/1
600 TABLET ORAL EVERY 6 HOURS PRN
Qty: 20 TABLET | Refills: 0 | Status: SHIPPED | OUTPATIENT
Start: 2022-06-26 | End: 2022-07-01

## 2022-06-26 RX ORDER — FLUTICASONE PROPIONATE 50 MCG
2 SPRAY, SUSPENSION (ML) NASAL DAILY
COMMUNITY
Start: 2022-06-21 | End: 2023-05-21

## 2022-06-26 RX ADMIN — DEXAMETHASONE SODIUM PHOSPHATE 8 MG: 4 INJECTION INTRA-ARTICULAR; INTRALESIONAL; INTRAMUSCULAR; INTRAVENOUS; SOFT TISSUE at 11:06

## 2022-06-26 NOTE — ED PROVIDER NOTES
Encounter Date: 6/26/2022    SCRIBE #1 NOTE: I, Sandra Ceballos, am scribing for, and in the presence of,  WILLIAMS Aguilar. I have scribed the following portions of the note - Other sections scribed: HPI; ROS; PE.       History     Chief Complaint   Patient presents with    Cough     Started 10 days but still having, cough, nasal drip, sore throat, and both ears but more on the right borthering     Milvia Tuttle is a 36 y.o. female with Hx of migraine headache who presents to the ED for chief complaint of sore throat, otalgia, and coughing onset 10 days ago. Patient reports that she went to Urgent Care 5 days ago and was tested for Covid-19 and Influenza which were both negative. She was prescribed cough syrup, antihistamine, 3-day steroid supply, and an inhaler. Patient has been endorsing compliance with these medications with no relief. No known drug allergies.  Patient denies tobacco, alcohol, or recreational drug use. She has not taken the Covid-19 and Influenza vaccines. Patient denies rash, fever, chest pain, SOB, numbness, weakness, tingling, abdominal pain, back pain, dysuria, hematuria, nausea, vomiting, diarrhea, or any other complaints.  Patient rates her pain as 4/10 and has taken her prescribed medications with minimal relief. No alleviating/aggravating factors.      The history is provided by the patient. No  was used.     Review of patient's allergies indicates:  No Known Allergies  Past Medical History:   Diagnosis Date    Abnormal Pap smear of cervix     s/p leep procedure    Anxiety     Depression     Migraine with aura     Vertigo      Past Surgical History:   Procedure Laterality Date    ADENOIDECTOMY       Family History   Adopted: Yes   Problem Relation Age of Onset    Heart disease Mother         unknown     Social History     Tobacco Use    Smoking status: Former Smoker     Packs/day: 0.25     Years: 10.00     Pack years: 2.50     Quit date: 2/14/2010     Years  since quittin.3    Smokeless tobacco: Never Used   Substance Use Topics    Alcohol use: Yes     Comment: socially    Drug use: No     Review of Systems   Constitutional: Negative for chills and fever.   HENT: Positive for ear pain and sore throat. Negative for congestion, rhinorrhea and trouble swallowing.    Eyes: Negative for pain, discharge and redness.   Respiratory: Positive for cough. Negative for shortness of breath.    Cardiovascular: Negative for chest pain.   Gastrointestinal: Negative for abdominal pain, diarrhea, nausea and vomiting.   Genitourinary: Negative for decreased urine volume and dysuria.   Musculoskeletal: Negative for back pain, neck pain and neck stiffness.   Skin: Negative for rash.   Neurological: Negative for dizziness, weakness, light-headedness, numbness and headaches.   Psychiatric/Behavioral: Negative for confusion.       Physical Exam     Initial Vitals [22 0954]   BP Pulse Resp Temp SpO2   130/78 79 18 98.6 °F (37 °C) 98 %      MAP       --         Physical Exam    Nursing note and vitals reviewed.  Constitutional: Vital signs are normal. She appears well-developed.  Non-toxic appearance. She does not appear ill.   HENT:   Head: Normocephalic and atraumatic.   Right Ear: Tympanic membrane and ear canal normal.   Left Ear: Tympanic membrane and ear canal normal.   Nose: Mucosal edema present. Right sinus exhibits no maxillary sinus tenderness and no frontal sinus tenderness. Left sinus exhibits no maxillary sinus tenderness and no frontal sinus tenderness.   Mouth/Throat: Uvula is midline, oropharynx is clear and moist and mucous membranes are normal. No oropharyngeal exudate, posterior oropharyngeal edema or posterior oropharyngeal erythema.   Postnasal drip present.   Eyes: Conjunctivae are normal.   Neck: No Brudzinski's sign and no Kernig's sign noted.   Normal range of motion.  Cardiovascular: Normal rate, regular rhythm, normal heart sounds and intact distal  pulses. Exam reveals no gallop and no friction rub.    No murmur heard.  Pulmonary/Chest: Effort normal and breath sounds normal. No respiratory distress. She has no wheezes. She has no rhonchi. She has no rales. She exhibits no tenderness.   Abdominal: Abdomen is soft. There is no abdominal tenderness.   Musculoskeletal:      Cervical back: Normal range of motion.     Lymphadenopathy:     She has no cervical adenopathy.   Neurological: She is alert and oriented to person, place, and time. Gait normal. GCS eye subscore is 4. GCS verbal subscore is 5. GCS motor subscore is 6.   Skin: Skin is warm, dry and intact. No rash noted.   Psychiatric: She has a normal mood and affect. Her speech is normal and behavior is normal. Judgment and thought content normal.         ED Course   Procedures  Labs Reviewed   SARS-COV-2 RDRP GENE    Narrative:     This test utilizes isothermal nucleic acid amplification   technology to detect the SARS-CoV-2 RdRp nucleic acid segment.   The analytical sensitivity (limit of detection) is 125 genome   equivalents/mL.   A POSITIVE result implies infection with the SARS-CoV-2 virus;   the patient is presumed to be contagious.     A NEGATIVE result means that SARS-CoV-2 nucleic acids are not   present above the limit of detection. A NEGATIVE result should be   treated as presumptive. It does not rule out the possibility of   COVID-19 and should not be the sole basis for treatment decisions.   If COVID-19 is strongly suspected based on clinical and exposure   history, re-testing using an alternate molecular assay should be   considered.   This test is only for use under the Food and Drug   Administration s Emergency Use Authorization (EUA).   Commercial kits are provided by [employer].   Performance characteristics of the EUA have been independently   verified by [facility] Department of   Pathology and Laboratory Medicine.    _________________________________________________________________   The authorized Fact Sheet for Healthcare Providers and the authorized Fact   Sheet for Patients of the ID NOW COVID-19 are available on the FDA   website:     https://www.fda.gov/media/597195/download  https://www.fda.gov/media/234425/download         POCT STREP A MOLECULAR   POCT INFLUENZA A/B MOLECULAR   POCT URINE PREGNANCY   POCT STREP A, RAPID   POCT RAPID INFLUENZA A/B          Imaging Results          X-Ray Chest AP Portable (Final result)  Result time 06/26/22 10:31:13    Final result by Joseph Haywood MD (06/26/22 10:31:13)                 Impression:      As above.      Electronically signed by: Joseph Haywood MD  Date:    06/26/2022  Time:    10:31             Narrative:    EXAMINATION:  XR CHEST AP PORTABLE    CLINICAL HISTORY:  COVID-19;    TECHNIQUE:  Single frontal view of the chest was performed.    FINDINGS:  The lungs are well expanded and unremarkable.  There is no pleural fluid or pneumothorax.  The cardiac silhouette is not enlarged.  The osseous structures are unremarkable.                                 Medications   dexamethasone injection 8 mg (8 mg Intramuscular Given 6/26/22 1107)     Medical Decision Making:   Independently Interpreted Test(s):   I have ordered and independently interpreted X-rays - see prior notes.  Clinical Tests:   Lab Tests: Ordered and Reviewed  Radiological Study: Ordered and Reviewed       APC / Resident Notes:   This is an evaluation of a 36 y.o. female that presents to the Emergency Department for URI symptoms. The patient is a non-toxic, afebrile, and well appearing female. On physical exam: Ears: without infection.  Pharynx without infection. Appears well hydrated with moist mucus membranes. Neck soft and supple with no meningeal signs or cervical lymphadenopathy. Breath sounds are clear and equal bilaterally with no adventitious breath sounds, tachypnea or respiratory distress with room air  pulse ox of 98% and no evidence of hypoxia.     Vital Signs Are Reassuring. RESULTS: UPT negative; COVID and Flu negative; CXR negative     My overall impression is Bronchitis, Viral URI with cough, Allergic rhinitis. I considered, but at this time, do not suspect OM, OE, COVID, Flu, strep pharyngitis, meningitis, pneumonia, bacterial sinusitis, or significant dehydration requiring IV fluids or admission.    D/C Meds: Azithromycin, astelin. D/C Information: Tylenol/Ibuprofen PRN, Hydration. The diagnosis, treatment plan, instructions for follow-up and reevaluation with Primary Care as well as ED return precautions were discussed and understanding was verbalized. All questions or concerns have been addressed.        Scribe Attestation:   Scribe #1: I performed the above scribed service and the documentation accurately describes the services I performed. I attest to the accuracy of the note.               I, WILLIAMS Aguilar, personally performed the services described in this documentation.  All medical record entries made by the scribe were at my direction and in my presence.  I have reviewed the chart and agree that the record reflects my personal performance and is accurate and complete.  Clinical Impression:   Final diagnoses:  [J20.9] Acute bronchitis, unspecified organism (Primary)  [J06.9] Viral URI with cough  [J30.9] Allergic rhinitis, unspecified seasonality, unspecified trigger          ED Disposition Condition    Discharge Stable        ED Prescriptions     Medication Sig Dispense Start Date End Date Auth. Provider    acetaminophen (TYLENOL) 650 MG TbSR Take 1 tablet (650 mg total) by mouth every 8 (eight) hours. for 5 days 20 tablet 6/26/2022 7/1/2022 ADILSON Davis    ibuprofen (ADVIL,MOTRIN) 600 MG tablet Take 1 tablet (600 mg total) by mouth every 6 (six) hours as needed for Pain. 20 tablet 6/26/2022 7/1/2022 ADILSON Davis    azithromycin (Z-CHARLES) 250 MG tablet Take 1 tablet (250 mg total) by  mouth once daily. Take first 2 tablets together, then 1 every day until finished. for 5 days 6 tablet 6/26/2022 7/1/2022 ADILSON Davis    azelastine (ASTELIN) 137 mcg (0.1 %) nasal spray 1 spray (137 mcg total) by Nasal route 2 (two) times daily. 30 mL 6/26/2022 6/26/2023 ADILSON Davis        Follow-up Information     Follow up With Specialties Details Why Contact Info    Rach Barraza MD Internal Medicine Schedule an appointment as soon as possible for a visit in 2 days  [address] [facility] [address]  [phone]      [facility] ED Emergency Medicine Go to  If symptoms worsen [address] [facility] [address] [phone]           ADILSON Davis  06/26/22 8783

## 2022-06-26 NOTE — Clinical Note
"Milvia"Emma Tuttle was seen and treated in our emergency department on 6/26/2022.     COVID-19 is present in our communities across the state. There is limited testing for COVID at this time, so not all patients can be tested. In this situation, your employee meets the following criteria:    Milvia Tuttle has met the criteria for COVID-19 testing and has a NEGATIVE result. The employee can return to work once they are asymptomatic for 24 hours without the use of fever reducing medications (Tylenol, Motrin, etc).     If the employee is not fully vaccinated and had a close contact:  · Retest at 5 to 7 days post-exposure  · If possible, it is recommended that they quarantine for 5 days from the time of contact regardless of their test status.  · A mask should be worn post quarantine for 5 days.    If you have any questions or concerns, or if I can be of further assistance, please do not hesitate to contact me.    Sincerely,             ADILSON Davis"

## 2022-10-25 ENCOUNTER — OFFICE VISIT (OUTPATIENT)
Dept: OTOLARYNGOLOGY | Facility: CLINIC | Age: 37
End: 2022-10-25
Payer: COMMERCIAL

## 2022-10-25 DIAGNOSIS — J31.0 CHRONIC RHINITIS: Primary | ICD-10-CM

## 2022-10-25 PROCEDURE — 1160F RVW MEDS BY RX/DR IN RCRD: CPT | Mod: CPTII,S$GLB,, | Performed by: NURSE PRACTITIONER

## 2022-10-25 PROCEDURE — 1159F MED LIST DOCD IN RCRD: CPT | Mod: CPTII,S$GLB,, | Performed by: NURSE PRACTITIONER

## 2022-10-25 PROCEDURE — 1160F PR REVIEW ALL MEDS BY PRESCRIBER/CLIN PHARMACIST DOCUMENTED: ICD-10-PCS | Mod: CPTII,S$GLB,, | Performed by: NURSE PRACTITIONER

## 2022-10-25 PROCEDURE — 99203 PR OFFICE/OUTPT VISIT, NEW, LEVL III, 30-44 MIN: ICD-10-PCS | Mod: S$GLB,,, | Performed by: NURSE PRACTITIONER

## 2022-10-25 PROCEDURE — 99999 PR PBB SHADOW E&M-EST. PATIENT-LVL III: ICD-10-PCS | Mod: PBBFAC,,, | Performed by: NURSE PRACTITIONER

## 2022-10-25 PROCEDURE — 99203 OFFICE O/P NEW LOW 30 MIN: CPT | Mod: S$GLB,,, | Performed by: NURSE PRACTITIONER

## 2022-10-25 PROCEDURE — 1159F PR MEDICATION LIST DOCUMENTED IN MEDICAL RECORD: ICD-10-PCS | Mod: CPTII,S$GLB,, | Performed by: NURSE PRACTITIONER

## 2022-10-25 PROCEDURE — 99999 PR PBB SHADOW E&M-EST. PATIENT-LVL III: CPT | Mod: PBBFAC,,, | Performed by: NURSE PRACTITIONER

## 2022-10-25 RX ORDER — LEVOCETIRIZINE DIHYDROCHLORIDE 5 MG/1
5 TABLET, FILM COATED ORAL NIGHTLY
Qty: 90 TABLET | Refills: 3 | Status: SHIPPED | OUTPATIENT
Start: 2022-10-25 | End: 2023-05-16

## 2022-10-25 NOTE — PATIENT INSTRUCTIONS
Chronic rhinitis  -     levocetirizine (XYZAL) 5 MG tablet; Take 1 tablet (5 mg total) by mouth every evening.        -     Fluticasone (Flonase) - 2 sprays each nostril daily        -     Azelastine (Astelin) - 1 spray each nostril twice daily

## 2022-10-25 NOTE — PROGRESS NOTES
Subjective:      Milvia Tuttle is a 37 y.o. female who was self-referred for post-nasal drip.    Ms. Tuttle reports having a long history of allergy related symptoms. She reports most recently having bothersome post-nasal drip, ear pressure, periorbital pressure and left eye swelling that comes and goes (she provided a photograph of her left swollen eye in beginning of Sept. 2022). She currently uses fluticasone, zyrtec, and azelastine with limited benefit. She had seen a provider at [facility] where nasal endoscopy was performed, but noted septal deviation, but no other significant findings. She was recommended CT imaging, but she has not pursued this.     Current sinonasal medications as above.  She does not regularly use nasal decongestant sprays.    She does not recall previously having allergy testing.    She denies a history of asthma.    She denies a history of reflux symptoms.  She has not previously had an EGD.    She denies have a diagnosis of obstructive sleep apnea.     She has not had sinonasal surgery. Adenoidectomy in 1989.    She does not recall a prior history of nasal trauma.        Past Medical History  She has a past medical history of Abnormal Pap smear of cervix, Anxiety, Depression, Migraine with aura, and Vertigo.    Past Surgical History  She has a past surgical history that includes Adenoidectomy.    Family History  Her family history includes Heart disease in her mother. She was adopted.    Social History  She reports that she quit smoking about 12 years ago. She has a 2.50 pack-year smoking history. She has never used smokeless tobacco. She reports current alcohol use. She reports that she does not use drugs.    Allergies  She has No Known Allergies.    Medications   She has a current medication list which includes the following prescription(s): albuterol, azelastine, benzonatate, citalopram, etonogestrel, fluticasone propionate, promethazine-dextromethorphan, valacyclovir,  levocetirizine, [DISCONTINUED] sumatriptan, and [DISCONTINUED] topiramate.      Review of Systems     Constitutional: Negative for chills and fever.      HENT: Positive for hearing loss (muffled) and postnasal drip.  Negative for ear pain, facial swelling, ringing in the ears, runny nose, sinus pressure, sore throat and stuffy nose.  Ear pressure      Eyes:  Positive for eye drainage and eye itching. Negative for change in eyesight.     Respiratory:  Negative for cough and shortness of breath.      Cardiovascular:  Negative for chest pain and foot swelling.     Gastrointestinal:  Negative for acid reflux, heartburn and vomiting.     Skin: Negative for rash.     Allergy: Negative for food allergies and seasonal allergies.     Neurological: Negative for dizziness and headaches.      Hematologic: Negative for swollen glands.      Psychiatric: Negative for decreased concentration and sleep disturbance.          Objective:     There were no vitals taken for this visit.       Constitutional:   She is oriented to person, place, and time. Vital signs are normal. She appears well-developed and well-nourished. She appears alert. Normal speech.      Head:  Normocephalic and atraumatic.     Ears:    Right Ear: No lacerations. No drainage, swelling or tenderness. No foreign bodies. No mastoid tenderness. Tympanic membrane is not injected, not scarred, not perforated, not erythematous, not retracted and not bulging. Tympanic membrane mobility is normal. No middle ear effusion. No hemotympanum.   Left Ear: No lacerations. No drainage, swelling or tenderness. No foreign bodies. No mastoid tenderness. Tympanic membrane is not injected, not scarred, not perforated, not erythematous, not retracted and not bulging. Tympanic membrane mobility is normal.  No middle ear effusion. No hemotympanum.     Nose:  Mucosal edema and septal deviation (mild right sided septal deviation) present. No rhinorrhea, nose lacerations, sinus tenderness,  nasal septal hematoma or polyps. No epistaxis.  No foreign bodies. Turbinate hypertrophy.  Right sinus exhibits no maxillary sinus tenderness and no frontal sinus tenderness. Left sinus exhibits no maxillary sinus tenderness and no frontal sinus tenderness.     Mouth/Throat  Oropharynx clear and moist without lesions or asymmetry, normal uvula midline and lips, teeth, and gums normal. No uvula swelling, oral lesions, trismus, mucous membrane lesions or xerostomia. No oropharyngeal exudate, posterior oropharyngeal edema or posterior oropharyngeal erythema.     Neck:  Neck normal without thyromegaly masses, asymmetry, normal tracheal structure, crepitus, and tenderness and no adenopathy.     Psychiatric:   She has a normal mood and affect. Her speech is normal and behavior is normal.     Neurological:   She is alert and oriented to person, place, and time. No cranial nerve deficit.     Skin:   No abrasions, lacerations, lesions, or rashes.     Procedure    None      Data Reviewed    WBC (K/uL)   Date Value   02/16/2017 5.21     Eosinophil % (%)   Date Value   02/16/2017 1.5     Eos # (K/uL)   Date Value   02/16/2017 0.1     Platelets (K/uL)   Date Value   02/16/2017 249     Glucose (mg/dL)   Date Value   02/16/2017 91     No results found for: IGE    No sinus imaging available.       Assessment:     1. Chronic rhinitis         Plan:       Chronic rhinitis  -     levocetirizine (XYZAL) 5 MG tablet; Take 1 tablet (5 mg total) by mouth every evening.        -     Fluticasone (Flonase) - 2 sprays each nostril daily        -     Azelastine (Astelin) - 1 spray each nostril twice daily        -     If symptoms fail to improve, will consider CT imaging and allergy referral.      Follow up if symptoms worsen or fail to improve.

## 2023-05-15 ENCOUNTER — CLINICAL SUPPORT (OUTPATIENT)
Dept: AUDIOLOGY | Facility: CLINIC | Age: 38
End: 2023-05-15
Payer: COMMERCIAL

## 2023-05-15 ENCOUNTER — OFFICE VISIT (OUTPATIENT)
Dept: OTOLARYNGOLOGY | Facility: CLINIC | Age: 38
End: 2023-05-15
Payer: COMMERCIAL

## 2023-05-15 DIAGNOSIS — R42 DIZZINESS: ICD-10-CM

## 2023-05-15 DIAGNOSIS — Z01.10 NORMAL HEARING TEST OF BOTH EARS: ICD-10-CM

## 2023-05-15 DIAGNOSIS — R21 GENERALIZED RASH: ICD-10-CM

## 2023-05-15 DIAGNOSIS — H93.293 ABNORMAL AUDITORY PERCEPTION OF BOTH EARS: Primary | ICD-10-CM

## 2023-05-15 DIAGNOSIS — J31.0 CHRONIC RHINITIS: Primary | ICD-10-CM

## 2023-05-15 DIAGNOSIS — H93.11 TINNITUS OF RIGHT EAR: ICD-10-CM

## 2023-05-15 PROCEDURE — 1159F MED LIST DOCD IN RCRD: CPT | Mod: CPTII,S$GLB,,

## 2023-05-15 PROCEDURE — 92552 PURE TONE AUDIOMETRY AIR: CPT | Mod: S$GLB,,,

## 2023-05-15 PROCEDURE — 99214 PR OFFICE/OUTPT VISIT, EST, LEVL IV, 30-39 MIN: ICD-10-PCS | Mod: 25,S$GLB,,

## 2023-05-15 PROCEDURE — 99999 PR PBB SHADOW E&M-EST. PATIENT-LVL I: CPT | Mod: PBBFAC,,,

## 2023-05-15 PROCEDURE — 92567 TYMPANOMETRY: CPT | Mod: S$GLB,,,

## 2023-05-15 PROCEDURE — 92504 EAR MICROSCOPY EXAMINATION: CPT | Mod: S$GLB,,,

## 2023-05-15 PROCEDURE — 92552 PR PURE TONE AUDIOMETRY, AIR: ICD-10-PCS | Mod: S$GLB,,,

## 2023-05-15 PROCEDURE — 92556 PR SPEECH AUDIOMETRY, COMPLETE: ICD-10-PCS | Mod: S$GLB,,,

## 2023-05-15 PROCEDURE — 99214 OFFICE O/P EST MOD 30 MIN: CPT | Mod: 25,S$GLB,,

## 2023-05-15 PROCEDURE — 99999 PR PBB SHADOW E&M-EST. PATIENT-LVL III: ICD-10-PCS | Mod: PBBFAC,,,

## 2023-05-15 PROCEDURE — 99999 PR PBB SHADOW E&M-EST. PATIENT-LVL I: ICD-10-PCS | Mod: PBBFAC,,,

## 2023-05-15 PROCEDURE — 92504 PR EAR MICROSCOPY EXAMINATION: ICD-10-PCS | Mod: S$GLB,,,

## 2023-05-15 PROCEDURE — 92556 SPEECH AUDIOMETRY COMPLETE: CPT | Mod: S$GLB,,,

## 2023-05-15 PROCEDURE — 99999 PR PBB SHADOW E&M-EST. PATIENT-LVL III: CPT | Mod: PBBFAC,,,

## 2023-05-15 PROCEDURE — 1159F PR MEDICATION LIST DOCUMENTED IN MEDICAL RECORD: ICD-10-PCS | Mod: CPTII,S$GLB,,

## 2023-05-15 PROCEDURE — 92567 PR TYMPA2METRY: ICD-10-PCS | Mod: S$GLB,,,

## 2023-05-15 RX ORDER — FLUTICASONE PROPIONATE 50 MCG
1 SPRAY, SUSPENSION (ML) NASAL DAILY
Qty: 11.1 ML | Refills: 1 | Status: SHIPPED | OUTPATIENT
Start: 2023-05-15

## 2023-05-15 RX ORDER — HYDROXYZINE HYDROCHLORIDE 25 MG/1
25 TABLET, FILM COATED ORAL EVERY 6 HOURS PRN
COMMUNITY
Start: 2023-04-21 | End: 2023-05-16

## 2023-05-15 RX ORDER — LEVOCETIRIZINE DIHYDROCHLORIDE 5 MG/1
5 TABLET, FILM COATED ORAL NIGHTLY
Qty: 30 TABLET | Refills: 3 | Status: SHIPPED | OUTPATIENT
Start: 2023-05-15 | End: 2024-05-14

## 2023-05-15 NOTE — PATIENT INSTRUCTIONS
Dizziness and Vertigo    Dizziness is a feeling that may be hard to describe, but often includes a feeling that you are spinning or tilting, or that you are about to fall or pass out. Dizziness can also cause you to feel lightheaded or giddy, or have difficulty walking straight.    Many people who feel dizzy have vertigo, a specific type of dizziness. Vertigo is a sense of spinning dizziness, swaying, or tilting. You may feel that you are moving or that the room is moving around you. Vertigo can be caused by a number of different problems involving the inner ear or brain. Some of these problems are not serious while others can be life threatening.    Causes of Vertigo    Benign paroxysmal positional vertigo (BPPV) - BPPV, sometimes called benign positional vertigo, positional vertigo, postural vertigo, or simply vertigo, is a type of vertigo that develops due to collections of calcium in the inner ear. These collections are called canaliths. Moving the canaliths (called canalith repositioning) is a common treatment for BPPV.    Vertigo is typically brief in people with BPPV, lasting seconds to minutes. Vertigo can be triggered by moving the head in certain ways. Many forms of vertigo are worsened by head movements. In BPPV it is CAUSED by movement.    Meniere disease - Meniere disease is a condition that causes repeated spells of vertigo, hearing loss, and ringing in the ears. Spells can last several minutes or hours. It is probably caused by a buildup of fluid in the inner ear.     Vestibular neuritis - Vestibular neuritis, or labyrinthitis, is probably caused by a virus that causes swelling around the balance nerve. People with vestibular neuritis develop sudden, severe vertigo, nausea, vomiting, and difficulty walking or standing up; these problems can last several days. Some people also develop difficulty hearing in one ear (labyrinthitis).    Head injury - Head injuries can affect the vestibular system in a  variety of ways, and lead to vertigo.     Medications - Other medications can affect the function of the inner ear or brain and lead to vertigo. Rarely, some medications can actually damage the inner ear.    Migraines - In a condition called vestibular migraine or migrainous vertigo, vertigo can be caused by a migraine. This type of vertigo usually happens along with a headache, but some patients have migraines without headache.     Other brain problems - Stroke or TIA (transient ischemic attack), bleeding in the brain, or multiple sclerosis can also cause vertigo. There are usually other symptoms, besides vertigo, that happen with these brain problems.    Non-vestibular causes of dizziness and imbalance    Around one in four people experience dizziness. Although it becomes more common with age, it can occur for a variety of reasons. People with a vestibular disorder may also experience dizziness due to other causes. Below is a list of some of the possible non-vestibular causes of dizziness. If you are experiencing symptoms of dizziness it is important to see a qualified health professional to find out the cause of your symptoms and for advice, treatment and referral to a specialist if necessary.    Side effects of medications  Some medications list dizziness as a side effect. If you take more than four different types of medication, this may also cause dizziness and increase your risk of falling.    Stress, tiredness and concentration  If you feel particularly stressed, anxious, angry or fearful, your heart rate increases and your breathing gets faster as blood is pumped to your muscles. A side effect of this is that you may feel sick or dizzy, since breathing too fast (hyperventilation) causes you to take in too much oxygen, which can make you dizzy. If you are tired or doing things you have to think about, this can affect balance.    Aging  Around one in five people over 60 are affected by dizziness and  imbalance. As people get older, the parts of the body which help maintain balance, e.g. the muscles, eyes and vestibular system in the inner ear, begin to function less well. Seeing in bad light may become difficult and there may be a permanent feeling of unsteadiness. Dizziness, however, does not tend to result from age-related changes alone and it is more likely to occur in people with a disorder or as a side-effect to medication.    Restricted blood flow to the brain  Dizziness and unsteadiness can also be the result of conditions that cause a temporary decrease in the blood flow to your brain. These conditions include:    Low blood pressure after standing up  Postural or orthostatic hypotension is a condition in which your blood pressure briefly drops when you get up quickly from sitting or lying down, causing nausea, dizziness, unsteadiness or blackouts. This is common and can be caused by dehydration, standing still for too long, becoming too hot and not eating enough or regularly enough (hypoglycaemia).    Low blood pressure after eating  Postprandial hypotension is a condition in which after eating (particularly after a large, high carbohydrate meal), your body does not compensate for the increased blood flow to your digestive system, causing dizziness, unsteadiness, or blackouts.    Osteoarthritis  A condition that causes damage and swelling of the joints. If the neck joints are affected, the blood flow to the brain can become restricted, causing temporary dizziness when you turn your head.    Arteriosclerosis  A build up of fatty deposits and cholesterol makes arteries become hard and narrow, reducing blood flow to the brain, which can cause dizziness. Arteriosclerosis can be caused by high blood pressure, diabetes, and an unhealthy lifestyle, and can lead to heart disease, stroke and blood clots.    Neurological diseases  Dizziness and unsteadiness can also be the result of damage to the areas of the  brain that coordinate balance, e.g. in people who have MS, stroke, Parkinsons disease or brain tumours.    Lack of exercise  Regular exercise is important for maintaining flexibility and strength, which can help balance. The stronger and more flexible the muscles and joints are, the better your body will be able to deal with different surfaces and keep you balanced          Use nasal saline irrigation or spray twice daily (morning and night). Do this 30 minutes to one hour before other sprays.    Use Flonase (fluticasone) nasal spray twice daily.     Correct application of nasal spray ( Flonase)  - insert into nare and point slightly up and out towards the eye.     Take a daily oral antihistamine Xyzal (levocitirizine) .    Nasal steroid sprays may take up to 2-3 weeks to experience full effects.

## 2023-05-15 NOTE — PROGRESS NOTES
"Subjective:   Milvia Tuttle is a 37 y.o. female who presents for right ear fullness, hearing loss, tinnitus and dizziness.  Ms. Tuttle reports she has been experiencing right ear fullness recently where she feels her right ear closes up with decreased hearing ( x 5 occurrences). She reports an occasional sharp ache which last for seconds . She reports intermittent " thumping"  or " "guitar string "tinnitus in right ear which occurs once a week. She reports she can not hear her  at times, right ear worse than left ear. She denies any otalgia or drainage. She reports history of sinus trouble, with post nasal drip. She reports generalized rash under her armpits, ankles, abdomen, and bilateral arms with itching. She reports she has a future allergy appointment. She denies having pets, new detergents or new medications. She reports a history of left ear periorbital swelling. She denies any facial pain, SOB or double vision. She denies using any nasal sprays or antihistamines currently. She reports history of adenoidectomy and tonsil stones. [facility] and noted a septal deviation and no other findings.   She reports brief ( seconds to minute ) occasional spontaneous dizziness when walking, "whoosy" sensation , relieved by standing still. She denies room spinning sensation.  She reports other occurrences with dizziness associated with heights,  spiral cases and looking up putting away dished in her kitchen. She denies any CNS symptoms , heart palpitations or LOC. She reports occasional headache and history of migraines. There is not a prior history of ear surgery. There is not a prior history of ear infections. There is not a history of ear trauma.     Past Medical History  She has a past medical history of Abnormal Pap smear of cervix, Anxiety, Depression, Migraine with aura, and Vertigo.    Past Surgical History  She has a past surgical " history that includes Adenoidectomy.    Family History  Her family history includes Heart disease in her mother. She was adopted.    Social History  She reports that she quit smoking about 13 years ago. She has a 2.50 pack-year smoking history. She has never used smokeless tobacco. She reports current alcohol use. She reports that she does not use drugs.    Allergies  She has No Known Allergies.    Medications  She has a current medication list which includes the following prescription(s): valacyclovir, albuterol, azelastine, benzonatate, citalopram, etonogestrel, fluticasone propionate, fluticasone propionate, hydroxyzine hcl, levocetirizine, promethazine-dextromethorphan, [DISCONTINUED] sumatriptan, and [DISCONTINUED] topiramate.  Review of Systems     Constitutional: Negative for appetite change, chills, fatigue, fever and unexpected weight loss.      HENT: Positive for ear pain, nosebleeds, postnasal drip and sinus pressure.      Eyes:  Positive for eye itching and photophobia.     Respiratory:  Positive for snoring.      Cardiovascular:  Negative for chest pain, foot swelling, irregular heartbeat and swollen veins.     Gastrointestinal:  Negative for abdominal pain, acid reflux, constipation, diarrhea, heartburn and vomiting.     Genitourinary: Negative for difficulty urinating, sexual problems and frequent urination.     Musc: Positive for aching muscles.     Skin: Positive for rash.     Allergy: Positive for seasonal allergies.     Endocrine: Positive for heat intolerance.     Neurological: Positive for dizziness, headaches and light-headedness.     Hematologic: Negative for bruises/bleeds easily and swollen glands.      Psychiatric: Positive for decreased concentration, depression, nervous/anxious and sleep disturbance.       Objective:     Constitutional:   She is oriented to person, place, and time. She appears well-developed and well-nourished. She appears alert. She is cooperative.  Non-toxic appearance.  She does not have a sickly appearance. Normal speech.      Head:  Normocephalic and atraumatic. Salivary glands normal.  Facial strength is normal.  Not on left side and not on right side.      Ears:  Hearing normal to normal and whispered voice; external ear normal without scars, lesions, or masses; ear canal, tympanic membrane, and middle ear normal., right ear hearing normal to normal and whispered voice; external ear normal without scars, lesions, or masses; ear canal, tympanic membrane, and middle ear normal. and left ear hearing normal to normal and whispered voice; external ear normal without scars, lesions, or masses; ear canal, tympanic membrane, and middle ear normal..   Right Ear: No lacerations. No drainage, swelling or tenderness. No foreign bodies. No mastoid tenderness. Tympanic membrane is not injected, not scarred, not perforated, not erythematous, not retracted and not bulging. Tympanic membrane mobility is normal. No middle ear effusion. No PE tube. No hemotympanum. No decreased hearing is noted.   Left Ear: No lacerations. No drainage, swelling or tenderness. No foreign bodies. No mastoid tenderness. Tympanic membrane is not injected, not scarred, not perforated, not erythematous, not retracted and not bulging. Tympanic membrane mobility is normal.  No middle ear effusion.  No PE tube. No hemotympanum. No decreased hearing is noted.   Iverson : no lateralization  Rinne: AC>BC right ear, AC> BC left ear    Nose:  Nose normal including turbinates, nasal mucosa, sinuses and nasal septum. Mucosal edema, rhinorrhea and septal deviation present. No sinus tenderness or nasal septal hematoma. No epistaxis. Turbinates normal, no turbinate masses and no turbinate hypertrophy.  Right sinus exhibits no maxillary sinus tenderness and no frontal sinus tenderness. Left sinus exhibits no maxillary sinus tenderness and no frontal sinus tenderness.     Mouth/Throat  Oropharynx clear and moist without lesions or  asymmetry, normal uvula midline, lips, teeth, and gums normal and oropharynx normal. Normal dentition. No uvula swelling, oral lesions, trismus or mucous membrane lesions. No oropharyngeal exudate, posterior oropharyngeal edema, posterior oropharyngeal erythema or tonsillar abscesses. Tonsillar erythema, tonsillar hyperemia, tonsillar exudate.          Neck:  Trachea normal, phonation normal and no adenopathy. Thyroid tenderness is present. No tracheal deviation, no stridor and no crepitus present. No thyroid mass and no thyromegaly present.     Pulmonary/Chest:   No stridor.     Psychiatric:   She has a normal mood and affect. Her speech is normal and behavior is normal.     Neurological:   She is alert and oriented to person, place, and time. No cranial nerve deficit. She displays a negative Romberg sign. Coordination and gait normal.     Moiz-Hallpike Left: Negative for torsional and up-beating nystagmus    Moiz-Hallpike Right: Negative for torsional and up-beating nystagmus    Head Impulse Test: Normal Response- Eyes remain on the target     Fakuda: slight deviation to left     Romberg: Negative     EOM: normal    Data Reviewed  WBC (K/uL)   Date Value   02/16/2017 5.21     Eosinophil % (%)   Date Value   02/16/2017 1.5     Eos # (K/uL)   Date Value   02/16/2017 0.1     Platelets (K/uL)   Date Value   02/16/2017 249     Glucose (mg/dL)   Date Value   02/16/2017 91     No results found for: IGE        Audiogram      I independently reviewed the tracings of the complete audiometric evaluation performed today.  I reviewed the audiogram with the patient as well.  Pertinent findings include a normal study.  Assessment:     1. Chronic rhinitis    2. Normal hearing test of both ears    3. Tinnitus of right ear    4. Dizziness    5. Generalized rash      Plan:   Milvia was seen today for hearing loss, tinnitus, ear fullness and dizziness.    Diagnoses and all orders for this visit:    Chronic rhinitis  -     fluticasone  propionate (FLONASE) 50 mcg/actuation nasal spray; 1 spray (50 mcg total) by Each Nostril route once daily.  -     levocetirizine (XYZAL) 5 MG tablet; Take 1 tablet (5 mg total) by mouth every evening.  -     Ambulatory referral/consult to Allergy; Future  I recommended the daily use of high-volume saline rinse to maintain sinonasal hygiene.   Normal hearing test of both ears  Reviewed patient's audiometric testing interpretation which is consistent with normal hearing bilaterally.  Hearing conservation strongly recommended when in noisy environments.  Patient encouraged to return to clinic every 2 years for audiometric testing  Tinnitus of right ear  Hearing test and otologic exam under microscopy/ pneumatic otoscopy normal. No infection, inflammation or drainage. I checked for any new medications. Patient denies no new medications. I provided reassurance of normal exam. If tinnitus worsen or becomes constant, we should further evaluate in in the future.   Discussed the etiology of tinnitus and management strategies including the use of background sound enrichment or maskers/ hearing aids. Further instructed that avoidance of caffeine, alcohol, tobacco, better sleep hygiene and stress can be of benefit.    Dizziness  I discussed with patient if dizziness becomes recurrent , we should reassess further. I discussed possible correlation with headaches/ dizziness. I may consider a possible VNG in the future to further assess her dizziness. RTC if symptoms worsen or persist .  Generalized rash  - allergy referral . Allergy testing reccommended      RTC as needed.   Questions answered during visit.

## 2023-05-15 NOTE — PROGRESS NOTES
"Milvia Tuttle was seen today in the clinic for an audiologic evaluation.  Patient's main complaint was aural fullness, decreased hearing, and "thumping" ringing in the right ear.  Mrs. Tuttle reported that her symptoms are intermittent. She also noted a longstanding history of sinus trouble.    Tympanometry revealed Type A in the right ear and Type A in the left ear.     Audiogram results revealed normal hearing sensitivity bilaterally.      Speech reception thresholds were noted at 10 dB in the right ear and 10 dB in the left ear.    Speech discrimination scores were 100% in the right ear and 100% in the left ear.    Recommendations:  Otologic evaluation  Repeat audiogram as needed  Hearing protection when in noise            "

## 2023-05-16 ENCOUNTER — OFFICE VISIT (OUTPATIENT)
Dept: FAMILY MEDICINE | Facility: CLINIC | Age: 38
End: 2023-05-16
Payer: COMMERCIAL

## 2023-05-16 ENCOUNTER — LAB VISIT (OUTPATIENT)
Dept: LAB | Facility: HOSPITAL | Age: 38
End: 2023-05-16
Payer: COMMERCIAL

## 2023-05-16 VITALS
WEIGHT: 175.94 LBS | BODY MASS INDEX: 26.66 KG/M2 | HEIGHT: 68 IN | SYSTOLIC BLOOD PRESSURE: 118 MMHG | HEART RATE: 80 BPM | OXYGEN SATURATION: 98 % | DIASTOLIC BLOOD PRESSURE: 62 MMHG | TEMPERATURE: 99 F

## 2023-05-16 DIAGNOSIS — Z00.00 GENERAL MEDICAL EXAM: ICD-10-CM

## 2023-05-16 DIAGNOSIS — L29.9 PRURITUS: ICD-10-CM

## 2023-05-16 DIAGNOSIS — Z11.59 NEED FOR HEPATITIS C SCREENING TEST: ICD-10-CM

## 2023-05-16 DIAGNOSIS — G25.81 RESTLESS LEG: ICD-10-CM

## 2023-05-16 DIAGNOSIS — Z13.6 ENCOUNTER FOR LIPID SCREENING FOR CARDIOVASCULAR DISEASE: ICD-10-CM

## 2023-05-16 DIAGNOSIS — R21 RASH: ICD-10-CM

## 2023-05-16 DIAGNOSIS — Z13.220 ENCOUNTER FOR LIPID SCREENING FOR CARDIOVASCULAR DISEASE: ICD-10-CM

## 2023-05-16 DIAGNOSIS — Z11.4 SCREENING FOR HIV (HUMAN IMMUNODEFICIENCY VIRUS): ICD-10-CM

## 2023-05-16 DIAGNOSIS — E66.3 OVERWEIGHT: ICD-10-CM

## 2023-05-16 DIAGNOSIS — Z13.1 SCREENING FOR DIABETES MELLITUS: ICD-10-CM

## 2023-05-16 DIAGNOSIS — Z00.00 GENERAL MEDICAL EXAM: Primary | ICD-10-CM

## 2023-05-16 PROBLEM — M54.30 SCIATICA: Status: ACTIVE | Noted: 2020-09-16

## 2023-05-16 PROBLEM — F33.1 MDD (MAJOR DEPRESSIVE DISORDER), RECURRENT EPISODE, MODERATE: Status: ACTIVE | Noted: 2019-06-26

## 2023-05-16 PROBLEM — G47.30 SLEEP APNEA: Status: RESOLVED | Noted: 2017-02-15 | Resolved: 2023-05-16

## 2023-05-16 LAB
ALBUMIN SERPL BCP-MCNC: 3.9 G/DL (ref 3.5–5.2)
ALP SERPL-CCNC: 42 U/L (ref 55–135)
ALT SERPL W/O P-5'-P-CCNC: 11 U/L (ref 10–44)
ANION GAP SERPL CALC-SCNC: 10 MMOL/L (ref 8–16)
AST SERPL-CCNC: 12 U/L (ref 10–40)
BASOPHILS # BLD AUTO: 0.03 K/UL (ref 0–0.2)
BASOPHILS NFR BLD: 0.6 % (ref 0–1.9)
BILIRUB SERPL-MCNC: 0.5 MG/DL (ref 0.1–1)
BUN SERPL-MCNC: 12 MG/DL (ref 6–20)
CALCIUM SERPL-MCNC: 9.4 MG/DL (ref 8.7–10.5)
CHLORIDE SERPL-SCNC: 105 MMOL/L (ref 95–110)
CHOLEST SERPL-MCNC: 199 MG/DL (ref 120–199)
CHOLEST/HDLC SERPL: 3 {RATIO} (ref 2–5)
CO2 SERPL-SCNC: 25 MMOL/L (ref 23–29)
CREAT SERPL-MCNC: 0.8 MG/DL (ref 0.5–1.4)
DIFFERENTIAL METHOD: NORMAL
EOSINOPHIL # BLD AUTO: 0.1 K/UL (ref 0–0.5)
EOSINOPHIL NFR BLD: 1.7 % (ref 0–8)
ERYTHROCYTE [DISTWIDTH] IN BLOOD BY AUTOMATED COUNT: 12.6 % (ref 11.5–14.5)
EST. GFR  (NO RACE VARIABLE): >60 ML/MIN/1.73 M^2
GLUCOSE SERPL-MCNC: 98 MG/DL (ref 70–110)
HCT VFR BLD AUTO: 41.1 % (ref 37–48.5)
HDLC SERPL-MCNC: 67 MG/DL (ref 40–75)
HDLC SERPL: 33.7 % (ref 20–50)
HGB BLD-MCNC: 13.4 G/DL (ref 12–16)
IMM GRANULOCYTES # BLD AUTO: 0.01 K/UL (ref 0–0.04)
IMM GRANULOCYTES NFR BLD AUTO: 0.2 % (ref 0–0.5)
LDLC SERPL CALC-MCNC: 119.6 MG/DL (ref 63–159)
LYMPHOCYTES # BLD AUTO: 1.9 K/UL (ref 1–4.8)
LYMPHOCYTES NFR BLD: 40.1 % (ref 18–48)
MCH RBC QN AUTO: 30.9 PG (ref 27–31)
MCHC RBC AUTO-ENTMCNC: 32.6 G/DL (ref 32–36)
MCV RBC AUTO: 95 FL (ref 82–98)
MONOCYTES # BLD AUTO: 0.4 K/UL (ref 0.3–1)
MONOCYTES NFR BLD: 7.9 % (ref 4–15)
NEUTROPHILS # BLD AUTO: 2.3 K/UL (ref 1.8–7.7)
NEUTROPHILS NFR BLD: 49.5 % (ref 38–73)
NONHDLC SERPL-MCNC: 132 MG/DL
NRBC BLD-RTO: 0 /100 WBC
PLATELET # BLD AUTO: 259 K/UL (ref 150–450)
PMV BLD AUTO: 11.2 FL (ref 9.2–12.9)
POTASSIUM SERPL-SCNC: 4.4 MMOL/L (ref 3.5–5.1)
PROT SERPL-MCNC: 6.7 G/DL (ref 6–8.4)
RBC # BLD AUTO: 4.34 M/UL (ref 4–5.4)
SODIUM SERPL-SCNC: 140 MMOL/L (ref 136–145)
TRIGL SERPL-MCNC: 62 MG/DL (ref 30–150)
WBC # BLD AUTO: 4.66 K/UL (ref 3.9–12.7)

## 2023-05-16 PROCEDURE — 87389 HIV-1 AG W/HIV-1&-2 AB AG IA: CPT | Performed by: FAMILY MEDICINE

## 2023-05-16 PROCEDURE — 3074F PR MOST RECENT SYSTOLIC BLOOD PRESSURE < 130 MM HG: ICD-10-PCS | Mod: CPTII,S$GLB,, | Performed by: FAMILY MEDICINE

## 2023-05-16 PROCEDURE — 85025 COMPLETE CBC W/AUTO DIFF WBC: CPT | Performed by: FAMILY MEDICINE

## 2023-05-16 PROCEDURE — 1159F MED LIST DOCD IN RCRD: CPT | Mod: CPTII,S$GLB,, | Performed by: FAMILY MEDICINE

## 2023-05-16 PROCEDURE — 3074F SYST BP LT 130 MM HG: CPT | Mod: CPTII,S$GLB,, | Performed by: FAMILY MEDICINE

## 2023-05-16 PROCEDURE — 1160F RVW MEDS BY RX/DR IN RCRD: CPT | Mod: CPTII,S$GLB,, | Performed by: FAMILY MEDICINE

## 2023-05-16 PROCEDURE — 3044F PR MOST RECENT HEMOGLOBIN A1C LEVEL <7.0%: ICD-10-PCS | Mod: CPTII,S$GLB,, | Performed by: FAMILY MEDICINE

## 2023-05-16 PROCEDURE — 3008F BODY MASS INDEX DOCD: CPT | Mod: CPTII,S$GLB,, | Performed by: FAMILY MEDICINE

## 2023-05-16 PROCEDURE — 86803 HEPATITIS C AB TEST: CPT | Performed by: FAMILY MEDICINE

## 2023-05-16 PROCEDURE — 99999 PR PBB SHADOW E&M-EST. PATIENT-LVL IV: ICD-10-PCS | Mod: PBBFAC,,, | Performed by: FAMILY MEDICINE

## 2023-05-16 PROCEDURE — 3078F DIAST BP <80 MM HG: CPT | Mod: CPTII,S$GLB,, | Performed by: FAMILY MEDICINE

## 2023-05-16 PROCEDURE — 3078F PR MOST RECENT DIASTOLIC BLOOD PRESSURE < 80 MM HG: ICD-10-PCS | Mod: CPTII,S$GLB,, | Performed by: FAMILY MEDICINE

## 2023-05-16 PROCEDURE — 80053 COMPREHEN METABOLIC PANEL: CPT | Performed by: FAMILY MEDICINE

## 2023-05-16 PROCEDURE — 86003 ALLG SPEC IGE CRUDE XTRC EA: CPT | Performed by: FAMILY MEDICINE

## 2023-05-16 PROCEDURE — 99999 PR PBB SHADOW E&M-EST. PATIENT-LVL IV: CPT | Mod: PBBFAC,,, | Performed by: FAMILY MEDICINE

## 2023-05-16 PROCEDURE — 3008F PR BODY MASS INDEX (BMI) DOCUMENTED: ICD-10-PCS | Mod: CPTII,S$GLB,, | Performed by: FAMILY MEDICINE

## 2023-05-16 PROCEDURE — 99385 PREV VISIT NEW AGE 18-39: CPT | Mod: S$GLB,,, | Performed by: FAMILY MEDICINE

## 2023-05-16 PROCEDURE — 1159F PR MEDICATION LIST DOCUMENTED IN MEDICAL RECORD: ICD-10-PCS | Mod: CPTII,S$GLB,, | Performed by: FAMILY MEDICINE

## 2023-05-16 PROCEDURE — 1160F PR REVIEW ALL MEDS BY PRESCRIBER/CLIN PHARMACIST DOCUMENTED: ICD-10-PCS | Mod: CPTII,S$GLB,, | Performed by: FAMILY MEDICINE

## 2023-05-16 PROCEDURE — 99385 PR PREVENTIVE VISIT,NEW,18-39: ICD-10-PCS | Mod: S$GLB,,, | Performed by: FAMILY MEDICINE

## 2023-05-16 PROCEDURE — 80061 LIPID PANEL: CPT | Performed by: FAMILY MEDICINE

## 2023-05-16 PROCEDURE — 83036 HEMOGLOBIN GLYCOSYLATED A1C: CPT | Performed by: FAMILY MEDICINE

## 2023-05-16 PROCEDURE — 86003 ALLG SPEC IGE CRUDE XTRC EA: CPT | Mod: 59 | Performed by: FAMILY MEDICINE

## 2023-05-16 PROCEDURE — 3044F HG A1C LEVEL LT 7.0%: CPT | Mod: CPTII,S$GLB,, | Performed by: FAMILY MEDICINE

## 2023-05-16 NOTE — PROGRESS NOTES
"  Patient Name: Milvia Tuttle    : 1985  MRN: [id number]      Subjective:     Patient ID: Milvia is a 37 y.o. female    Chief Complaint:  Establish Care    37 year old female comes in for annual wellness check.     She reports history of GYN concerns for which she had a LEEP done in  and 2 surgical procedures including one for a high grade lesion in . Last pap smear was last year and she states that it was normal.. Menstrual periods are regular.     States that she goes to [facility] for dense breasts and cysts and states she had a breast cyst biopsy done 2 years ago. She is being followed every 6 months.     She has  a history of major depression and PTSD, and is going to be establishing care with a new psychiatric provider soon. Previous moved to [place].     States that she has been having a recurring itchy rash around extremities and torso for last 4 weeks. Flair ups in different locations. Not sure what is provoking it. Has been referred to Allergist.     Also reports history of restless leg syndrome. Has been happening for years, and has episodes at least 4 times a week.        Review of Systems   Constitutional:  Negative for unexpected weight change.   HENT:  Negative for ear pain and sore throat.    Eyes:  Negative for visual disturbance.   Respiratory:  Negative for shortness of breath.    Cardiovascular:  Negative for chest pain.   Gastrointestinal:  Negative for abdominal pain and blood in stool.   Endocrine: Positive for heat intolerance. Negative for cold intolerance.   Genitourinary:  Negative for dysuria and frequency.   Musculoskeletal:  Positive for myalgias.   Integumentary:  Positive for rash.   Neurological:  Positive for dizziness. Negative for weakness.   Hematological:  Negative for adenopathy.      Objective:   /62 (BP Location: Left arm, Patient Position: Sitting, BP Method: Medium (Manual))   Pulse 80   Temp 98.6 °F (37 °C) (Oral)   Ht 5' 8" " (1.727 m)   Wt 79.8 kg (175 lb 14.8 oz)   LMP 05/07/2023 (Approximate)   SpO2 98%   BMI 26.75 kg/m²     Physical Exam  Vitals reviewed.   Constitutional:       General: She is not in acute distress.  HENT:      Head: Normocephalic and atraumatic.      Right Ear: Ear canal and external ear normal.      Left Ear: Ear canal and external ear normal.      Nose: Nose normal.      Mouth/Throat:      Mouth: Mucous membranes are moist.      Pharynx: No oropharyngeal exudate or posterior oropharyngeal erythema.   Eyes:      Extraocular Movements: Extraocular movements intact.      Conjunctiva/sclera: Conjunctivae normal.      Pupils: Pupils are equal, round, and reactive to light.   Cardiovascular:      Rate and Rhythm: Normal rate and regular rhythm.      Pulses: Normal pulses.      Heart sounds: Normal heart sounds.   Pulmonary:      Effort: Pulmonary effort is normal. No respiratory distress.      Breath sounds: No wheezing or rales.   Abdominal:      General: Abdomen is flat. Bowel sounds are normal. There is no distension.      Palpations: Abdomen is soft.      Tenderness: There is no abdominal tenderness. There is no guarding.   Musculoskeletal:      Cervical back: Normal range of motion. No rigidity or tenderness.   Lymphadenopathy:      Cervical: No cervical adenopathy.   Skin:     General: Skin is warm.      Capillary Refill: Capillary refill takes less than 2 seconds.   Neurological:      Mental Status: She is alert and oriented to person, place, and time.      Cranial Nerves: No cranial nerve deficit.      Sensory: No sensory deficit.   Psychiatric:         Mood and Affect: Mood normal.         Behavior: Behavior normal.      Assessment        ICD-10-CM ICD-9-CM   1. General medical exam  Z00.00 V70.9   2. Encounter for lipid screening for cardiovascular disease  Z13.220 V77.91    Z13.6 V81.2   3. Screening for diabetes mellitus  Z13.1 V77.1   4. Restless leg  G25.81 333.94   5. Rash  R21 782.1   6. Pruritus   L29.9 698.9   7. Screening for HIV (human immunodeficiency virus)  Z11.4 V73.89   8. Need for hepatitis C screening test  Z11.59 V73.89   9. Overweight  E66.3 278.02         Plan:     1. General medical exam  -     CBC Auto Differential; Future; Expected date: 05/16/2023  -     Comprehensive Metabolic Panel; Future; Expected date: 05/16/2023  -     Lipid Panel; Future; Expected date: 05/16/2023  Age appropriate recommendations reviewed.  Screening labs ordered.    2. Encounter for lipid screening for cardiovascular disease  -     Lipid Panel; Future; Expected date: 05/16/2023  Screening lipid panel ordered.     3. Screening for diabetes mellitus/  9. Overweight  -     Hemoglobin A1C; Future; Expected date: 05/16/2023  Screen for DM/PreDM as per USPSTF guidelines     4. Restless leg  -     Ambulatory referral/consult to Neurology; Future; Expected date: 05/23/2023  Referral to neurology placed.    5. Rash/  6. Pruritus  -     Aspergillus fumagatus IgE; Future; Expected date: 05/16/2023  -     Bermuda grass IgE; Future; Expected date: 05/16/2023  -     Cat epithelium IgE; Future; Expected date: 05/16/2023  -     Cladosporium IgE; Future; Expected date: 05/16/2023  -     Cockroach, American IgE; Future; Expected date: 05/16/2023  -     Patton, bald IgE; Future; Expected date: 05/16/2023  -     D. farinae IgE; Future; Expected date: 05/16/2023  -     D. pteronyssinus IgE; Future; Expected date: 05/16/2023  -     Dog dander IgE; Future; Expected date: 05/16/2023  -     Plantain, English IgE; Future; Expected date: 05/16/2023  -     Ernesto grass IgE; Future; Expected date: 05/16/2023  -     Wallace elder, rough IgE; Future; Expected date: 05/16/2023  -     Mugwort IgE; Future; Expected date: 05/16/2023  -     Nettle IgE; Future; Expected date: 05/16/2023  -     Oak, white IgE; Future; Expected date: 05/16/2023  -     Penicillium IgE; Future; Expected date: 05/16/2023  -     Ragweed, short, common IgE; Future; Expected  date: 05/16/2023  -     Eder IgE; Future; Expected date: 05/16/2023  -     Allergen, Cocklebur; Future; Expected date: 05/16/2023  -     Allergen, Elm Cedar; Future; Expected date: 05/16/2023  -     Allergen, Meadow Grass ([place] Blue); Future; Expected date: 05/16/2023  -     Allergen, Mucor Racemosus; Future; Expected date: 05/16/2023  -     Allergen, Pecan Tree IgE; Future; Expected date: 05/16/2023  -     Allergen, White Davi; Future; Expected date: 05/16/2023  -     Allergen-Alternaria Alternata; Future; Expected date: 05/16/2023  -     Allergen-Cayey; Future; Expected date: 05/16/2023  -     Allergen-Common Pigweed; Future; Expected date: 05/16/2023  -     Allergen-Silver Birch; Future; Expected date: 05/16/2023  -     Feather Panel #2; Future; Expected date: 05/16/2023  -     RAST Allergen for [place]; Future; Expected date: 05/16/2023  -     RAST Allergen Maple (Klemme); Future; Expected date: 05/16/2023  -     RAST Allergen Helen; Future; Expected date: 05/16/2023  -     RAST Allergen, Lamb's Quarters; Future; Expected date: 05/16/2023  -     RAST Allergen, Sheep [place](Yellow Dock); Future; Expected date: 05/16/2023  Allergy panel ordered.    7. Screening for HIV (human immunodeficiency virus)  -     HIV 1/2 Ag/Ab (4th Gen); Future; Expected date: 05/16/2023  Screen for HIV as per USPSTF guidelines     8. Need for hepatitis C screening test  -     Hepatitis C Antibody; Future; Expected date: 05/16/2023  Screen for HCV as per USPSTF guidelines          -Albert Raza Jr., MD, AAHIVS      This office note has been dictated.  This dictation has been generated using M-Modal Fluency Direct dictation; some phonetic errors may occur.         Patient Instructions   I will message you once all the results are back      Future Appointments   Date Time Provider Department Center   5/25/2023  8:00 AM Leah Mcknight MD [facility]   6/28/2023  2:00 PM STEFF Nicole III, MD [facility]  [place]   7/18/2023 11:00 AM Екатерина Pearson MD [facility]

## 2023-05-17 LAB
ESTIMATED AVG GLUCOSE: 100 MG/DL (ref 68–131)
HBA1C MFR BLD: 5.1 % (ref 4–5.6)
HCV AB SERPL QL IA: NORMAL
HIV 1+2 AB+HIV1 P24 AG SERPL QL IA: NORMAL

## 2023-05-18 LAB
AMER SYCAMORE IGE QN: <0.1 KU/L
FEATHER PANEL #2: <0.1 KU/L

## 2023-05-19 LAB
A ALTERNATA IGE QN: <0.1 KU/L
A FUMIGATUS IGE QN: <0.1 KU/L
ALLERGEN BOXELDER MAPLE TREE IGE: <0.1 KU/L
ALLERGEN MAPLE (BOX ELDER) CLASS: NORMAL
ALLERGEN MULBERRY CLASS: NORMAL
ALLERGEN MULBERRY TREE IGE: <0.1 KU/L
ALLERGEN PIGWEED IGE: <0.1 KU/L
ALLERGEN WHITE ASH TREE IGE: <0.1 KU/L
BALD CYPRESS IGE QN: <0.1 KU/L
BERMUDA GRASS IGE QN: <0.1 KU/L
C HERBARUM IGE QN: <0.1 KU/L
CAT DANDER IGE QN: <0.1 KU/L
CEDAR IGE QN: <0.1 KU/L
COCKLEBUR IGE QN: <0.1 KU/L
COMMON PIGWEED CLASS: NORMAL
COMMON RAGWEED IGE QN: <0.1 KU/L
D FARINAE IGE QN: 9.15 KU/L
D PTERONYSS IGE QN: 8.62 KU/L
DEPRECATED A ALTERNATA IGE RAST QL: NORMAL
DEPRECATED A FUMIGATUS IGE RAST QL: NORMAL
DEPRECATED BALD CYPRESS IGE RAST QL: NORMAL
DEPRECATED BERMUDA GRASS IGE RAST QL: NORMAL
DEPRECATED C HERBARUM IGE RAST QL: NORMAL
DEPRECATED CAT DANDER IGE RAST QL: NORMAL
DEPRECATED CEDAR IGE RAST QL: NORMAL
DEPRECATED COCKLEBUR IGE RAST QL: NORMAL
DEPRECATED COMMON RAGWEED IGE RAST QL: NORMAL
DEPRECATED D FARINAE IGE RAST QL: ABNORMAL
DEPRECATED D PTERONYSS IGE RAST QL: ABNORMAL
DEPRECATED DOG DANDER IGE RAST QL: ABNORMAL
DEPRECATED ELDER IGE RAST QL: NORMAL
DEPRECATED ENGL PLANTAIN IGE RAST QL: NORMAL
DEPRECATED GOOSEFOOT IGE RAST QL: NORMAL
DEPRECATED JOHNSON GRASS IGE RAST QL: NORMAL
DEPRECATED KENT BLUE GRASS IGE RAST QL: NORMAL
DEPRECATED M RACEMOSUS IGE RAST QL: NORMAL
DEPRECATED MUGWORT IGE RAST QL: NORMAL
DEPRECATED NETTLE IGE RAST QL: NORMAL
DEPRECATED P NOTATUM IGE RAST QL: NORMAL
DEPRECATED PECAN/HICK TREE IGE RAST QL: NORMAL
DEPRECATED ROACH IGE RAST QL: NORMAL
DEPRECATED SHEEP SORREL IGE RAST QL: NORMAL
DEPRECATED SILVER BIRCH IGE RAST QL: NORMAL
DEPRECATED TIMOTHY IGE RAST QL: NORMAL
DEPRECATED WHITE OAK IGE RAST QL: NORMAL
DOG DANDER IGE QN: 5.2 KU/L
ELDER IGE QN: <0.1 KU/L
ELM CEDAR CLASS: NORMAL
ELM CEDAR, IGE: <0.1 KU/L
ENGL PLANTAIN IGE QN: <0.1 KU/L
GOOSEFOOT IGE QN: <0.1 KU/L
JOHNSON GRASS IGE QN: <0.1 KU/L
KENT BLUE GRASS IGE QN: <0.1 KU/L
M RACEMOSUS IGE QN: <0.1 KU/L
MUGWORT IGE QN: <0.1 KU/L
NETTLE IGE QN: <0.1 KU/L
P NOTATUM IGE QN: <0.1 KU/L
PECAN/HICK TREE IGE QN: <0.1 KU/L
ROACH IGE QN: <0.1 KU/L
SHEEP SORREL IGE QN: <0.1 KU/L
SILVER BIRCH IGE QN: <0.1 KU/L
TIMOTHY IGE QN: <0.1 KU/L
WHITE ASH CLASS: NORMAL
WHITE OAK IGE QN: <0.1 KU/L

## 2023-05-21 PROBLEM — F33.1 MDD (MAJOR DEPRESSIVE DISORDER), RECURRENT EPISODE, MODERATE: Status: RESOLVED | Noted: 2019-06-26 | Resolved: 2023-05-21

## 2023-05-21 PROBLEM — M54.30 SCIATICA: Status: RESOLVED | Noted: 2020-09-16 | Resolved: 2023-05-21

## 2023-05-21 PROBLEM — G43.009 MIGRAINE WITHOUT AURA AND WITHOUT STATUS MIGRAINOSUS, NOT INTRACTABLE: Status: RESOLVED | Noted: 2017-02-15 | Resolved: 2023-05-21

## 2023-05-25 ENCOUNTER — OFFICE VISIT (OUTPATIENT)
Dept: PSYCHIATRY | Facility: CLINIC | Age: 38
End: 2023-05-25
Payer: COMMERCIAL

## 2023-05-25 VITALS
HEART RATE: 81 BPM | WEIGHT: 169.88 LBS | HEIGHT: 68 IN | DIASTOLIC BLOOD PRESSURE: 70 MMHG | SYSTOLIC BLOOD PRESSURE: 124 MMHG | BODY MASS INDEX: 25.75 KG/M2

## 2023-05-25 DIAGNOSIS — F39 MOOD DISORDER: ICD-10-CM

## 2023-05-25 DIAGNOSIS — F43.10 PTSD (POST-TRAUMATIC STRESS DISORDER): ICD-10-CM

## 2023-05-25 DIAGNOSIS — G47.00 INSOMNIA, UNSPECIFIED TYPE: ICD-10-CM

## 2023-05-25 DIAGNOSIS — F40.10 SOCIAL ANXIETY DISORDER: Primary | ICD-10-CM

## 2023-05-25 PROCEDURE — 3078F DIAST BP <80 MM HG: CPT | Mod: CPTII,S$GLB,, | Performed by: STUDENT IN AN ORGANIZED HEALTH CARE EDUCATION/TRAINING PROGRAM

## 2023-05-25 PROCEDURE — 99999 PR PBB SHADOW E&M-EST. PATIENT-LVL III: ICD-10-PCS | Mod: PBBFAC,,, | Performed by: STUDENT IN AN ORGANIZED HEALTH CARE EDUCATION/TRAINING PROGRAM

## 2023-05-25 PROCEDURE — 3074F PR MOST RECENT SYSTOLIC BLOOD PRESSURE < 130 MM HG: ICD-10-PCS | Mod: CPTII,S$GLB,, | Performed by: STUDENT IN AN ORGANIZED HEALTH CARE EDUCATION/TRAINING PROGRAM

## 2023-05-25 PROCEDURE — 3008F BODY MASS INDEX DOCD: CPT | Mod: CPTII,S$GLB,, | Performed by: STUDENT IN AN ORGANIZED HEALTH CARE EDUCATION/TRAINING PROGRAM

## 2023-05-25 PROCEDURE — 90792 PR PSYCHIATRIC DIAGNOSTIC EVALUATION W/MEDICAL SERVICES: ICD-10-PCS | Mod: S$GLB,,, | Performed by: STUDENT IN AN ORGANIZED HEALTH CARE EDUCATION/TRAINING PROGRAM

## 2023-05-25 PROCEDURE — 3074F SYST BP LT 130 MM HG: CPT | Mod: CPTII,S$GLB,, | Performed by: STUDENT IN AN ORGANIZED HEALTH CARE EDUCATION/TRAINING PROGRAM

## 2023-05-25 PROCEDURE — 3044F PR MOST RECENT HEMOGLOBIN A1C LEVEL <7.0%: ICD-10-PCS | Mod: CPTII,S$GLB,, | Performed by: STUDENT IN AN ORGANIZED HEALTH CARE EDUCATION/TRAINING PROGRAM

## 2023-05-25 PROCEDURE — 3008F PR BODY MASS INDEX (BMI) DOCUMENTED: ICD-10-PCS | Mod: CPTII,S$GLB,, | Performed by: STUDENT IN AN ORGANIZED HEALTH CARE EDUCATION/TRAINING PROGRAM

## 2023-05-25 PROCEDURE — 3078F PR MOST RECENT DIASTOLIC BLOOD PRESSURE < 80 MM HG: ICD-10-PCS | Mod: CPTII,S$GLB,, | Performed by: STUDENT IN AN ORGANIZED HEALTH CARE EDUCATION/TRAINING PROGRAM

## 2023-05-25 PROCEDURE — 99999 PR PBB SHADOW E&M-EST. PATIENT-LVL III: CPT | Mod: PBBFAC,,, | Performed by: STUDENT IN AN ORGANIZED HEALTH CARE EDUCATION/TRAINING PROGRAM

## 2023-05-25 PROCEDURE — 90792 PSYCH DIAG EVAL W/MED SRVCS: CPT | Mod: S$GLB,,, | Performed by: STUDENT IN AN ORGANIZED HEALTH CARE EDUCATION/TRAINING PROGRAM

## 2023-05-25 PROCEDURE — 3044F HG A1C LEVEL LT 7.0%: CPT | Mod: CPTII,S$GLB,, | Performed by: STUDENT IN AN ORGANIZED HEALTH CARE EDUCATION/TRAINING PROGRAM

## 2023-05-25 RX ORDER — TRAZODONE HYDROCHLORIDE 50 MG/1
50 TABLET ORAL NIGHTLY
Qty: 30 TABLET | Refills: 11 | Status: SHIPPED | OUTPATIENT
Start: 2023-05-25 | End: 2023-07-03

## 2023-05-25 RX ORDER — LORAZEPAM 0.5 MG/1
0.5 TABLET ORAL DAILY PRN
Qty: 15 TABLET | Refills: 0 | Status: SHIPPED | OUTPATIENT
Start: 2023-05-25 | End: 2023-07-11 | Stop reason: SDUPTHER
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
## 2023-06-21 ENCOUNTER — TELEPHONE (OUTPATIENT)
Dept: ADMINISTRATIVE | Facility: HOSPITAL | Age: 38
End: 2023-06-21
Payer: COMMERCIAL

## 2023-07-03 ENCOUNTER — PATIENT MESSAGE (OUTPATIENT)
Dept: PSYCHIATRY | Facility: CLINIC | Age: 38
End: 2023-07-03
Payer: COMMERCIAL

## 2023-07-03 ENCOUNTER — OFFICE VISIT (OUTPATIENT)
Dept: PSYCHIATRY | Facility: CLINIC | Age: 38
End: 2023-07-03
Payer: COMMERCIAL

## 2023-07-03 VITALS
HEART RATE: 86 BPM | DIASTOLIC BLOOD PRESSURE: 66 MMHG | SYSTOLIC BLOOD PRESSURE: 112 MMHG | WEIGHT: 170.63 LBS | BODY MASS INDEX: 25.95 KG/M2

## 2023-07-03 DIAGNOSIS — F40.10 SOCIAL ANXIETY DISORDER: ICD-10-CM

## 2023-07-03 DIAGNOSIS — G47.00 INSOMNIA, UNSPECIFIED TYPE: Primary | ICD-10-CM

## 2023-07-03 DIAGNOSIS — F43.10 PTSD (POST-TRAUMATIC STRESS DISORDER): ICD-10-CM

## 2023-07-03 PROCEDURE — 3074F SYST BP LT 130 MM HG: CPT | Mod: CPTII,S$GLB,, | Performed by: STUDENT IN AN ORGANIZED HEALTH CARE EDUCATION/TRAINING PROGRAM

## 2023-07-03 PROCEDURE — 3078F DIAST BP <80 MM HG: CPT | Mod: CPTII,S$GLB,, | Performed by: STUDENT IN AN ORGANIZED HEALTH CARE EDUCATION/TRAINING PROGRAM

## 2023-07-03 PROCEDURE — 99999 PR PBB SHADOW E&M-EST. PATIENT-LVL II: ICD-10-PCS | Mod: PBBFAC,,, | Performed by: STUDENT IN AN ORGANIZED HEALTH CARE EDUCATION/TRAINING PROGRAM

## 2023-07-03 PROCEDURE — 3008F BODY MASS INDEX DOCD: CPT | Mod: CPTII,S$GLB,, | Performed by: STUDENT IN AN ORGANIZED HEALTH CARE EDUCATION/TRAINING PROGRAM

## 2023-07-03 PROCEDURE — 3074F PR MOST RECENT SYSTOLIC BLOOD PRESSURE < 130 MM HG: ICD-10-PCS | Mod: CPTII,S$GLB,, | Performed by: STUDENT IN AN ORGANIZED HEALTH CARE EDUCATION/TRAINING PROGRAM

## 2023-07-03 PROCEDURE — 99215 OFFICE O/P EST HI 40 MIN: CPT | Mod: S$GLB,,, | Performed by: STUDENT IN AN ORGANIZED HEALTH CARE EDUCATION/TRAINING PROGRAM

## 2023-07-03 PROCEDURE — 99999 PR PBB SHADOW E&M-EST. PATIENT-LVL II: CPT | Mod: PBBFAC,,, | Performed by: STUDENT IN AN ORGANIZED HEALTH CARE EDUCATION/TRAINING PROGRAM

## 2023-07-03 PROCEDURE — 99215 PR OFFICE/OUTPT VISIT, EST, LEVL V, 40-54 MIN: ICD-10-PCS | Mod: S$GLB,,, | Performed by: STUDENT IN AN ORGANIZED HEALTH CARE EDUCATION/TRAINING PROGRAM

## 2023-07-03 PROCEDURE — 3008F PR BODY MASS INDEX (BMI) DOCUMENTED: ICD-10-PCS | Mod: CPTII,S$GLB,, | Performed by: STUDENT IN AN ORGANIZED HEALTH CARE EDUCATION/TRAINING PROGRAM

## 2023-07-03 PROCEDURE — 3078F PR MOST RECENT DIASTOLIC BLOOD PRESSURE < 80 MM HG: ICD-10-PCS | Mod: CPTII,S$GLB,, | Performed by: STUDENT IN AN ORGANIZED HEALTH CARE EDUCATION/TRAINING PROGRAM

## 2023-07-03 PROCEDURE — 3044F PR MOST RECENT HEMOGLOBIN A1C LEVEL <7.0%: ICD-10-PCS | Mod: CPTII,S$GLB,, | Performed by: STUDENT IN AN ORGANIZED HEALTH CARE EDUCATION/TRAINING PROGRAM

## 2023-07-03 PROCEDURE — 3044F HG A1C LEVEL LT 7.0%: CPT | Mod: CPTII,S$GLB,, | Performed by: STUDENT IN AN ORGANIZED HEALTH CARE EDUCATION/TRAINING PROGRAM

## 2023-07-03 RX ORDER — TRAZODONE HYDROCHLORIDE 50 MG/1
100 TABLET ORAL NIGHTLY PRN
Qty: 60 TABLET | Refills: 11 | Status: SHIPPED | OUTPATIENT
Start: 2023-07-03 | End: 2023-10-30 | Stop reason: SDUPTHER

## 2023-07-03 RX ORDER — ESCITALOPRAM OXALATE 5 MG/1
5 TABLET ORAL DAILY
Qty: 30 TABLET | Refills: 11 | Status: SHIPPED | OUTPATIENT
Start: 2023-07-03 | End: 2023-08-16
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
## 2023-07-13 RX ORDER — LORAZEPAM 0.5 MG/1
0.5 TABLET ORAL DAILY PRN
Qty: 15 TABLET | Refills: 0 | Status: SHIPPED | OUTPATIENT
Start: 2023-07-13 | End: 2023-09-01 | Stop reason: SDUPTHER

## 2023-07-18 ENCOUNTER — LAB VISIT (OUTPATIENT)
Dept: LAB | Facility: HOSPITAL | Age: 38
End: 2023-07-18
Attending: PSYCHIATRY & NEUROLOGY
Payer: COMMERCIAL

## 2023-07-18 ENCOUNTER — OFFICE VISIT (OUTPATIENT)
Dept: NEUROLOGY | Facility: CLINIC | Age: 38
End: 2023-07-18
Payer: COMMERCIAL

## 2023-07-18 VITALS
BODY MASS INDEX: 26.16 KG/M2 | WEIGHT: 172.63 LBS | HEIGHT: 68 IN | DIASTOLIC BLOOD PRESSURE: 60 MMHG | SYSTOLIC BLOOD PRESSURE: 108 MMHG | HEART RATE: 70 BPM

## 2023-07-18 DIAGNOSIS — G25.81 RESTLESS LEG: ICD-10-CM

## 2023-07-18 LAB
FERRITIN SERPL-MCNC: 52 NG/ML (ref 20–300)
FOLATE SERPL-MCNC: 6.3 NG/ML (ref 4–24)
IRON SERPL-MCNC: 138 UG/DL (ref 30–160)
SATURATED IRON: 44 % (ref 20–50)
TOTAL IRON BINDING CAPACITY: 311 UG/DL (ref 250–450)
TRANSFERRIN SERPL-MCNC: 210 MG/DL (ref 200–375)

## 2023-07-18 PROCEDURE — 99204 PR OFFICE/OUTPT VISIT, NEW, LEVL IV, 45-59 MIN: ICD-10-PCS | Mod: S$GLB,,, | Performed by: PSYCHIATRY & NEUROLOGY

## 2023-07-18 PROCEDURE — 3078F PR MOST RECENT DIASTOLIC BLOOD PRESSURE < 80 MM HG: ICD-10-PCS | Mod: CPTII,S$GLB,, | Performed by: PSYCHIATRY & NEUROLOGY

## 2023-07-18 PROCEDURE — 82746 ASSAY OF FOLIC ACID SERUM: CPT | Performed by: PSYCHIATRY & NEUROLOGY

## 2023-07-18 PROCEDURE — 99999 PR PBB SHADOW E&M-EST. PATIENT-LVL III: CPT | Mod: PBBFAC,,, | Performed by: PSYCHIATRY & NEUROLOGY

## 2023-07-18 PROCEDURE — 83540 ASSAY OF IRON: CPT | Performed by: PSYCHIATRY & NEUROLOGY

## 2023-07-18 PROCEDURE — 3008F PR BODY MASS INDEX (BMI) DOCUMENTED: ICD-10-PCS | Mod: CPTII,S$GLB,, | Performed by: PSYCHIATRY & NEUROLOGY

## 2023-07-18 PROCEDURE — 99204 OFFICE O/P NEW MOD 45 MIN: CPT | Mod: S$GLB,,, | Performed by: PSYCHIATRY & NEUROLOGY

## 2023-07-18 PROCEDURE — 3008F BODY MASS INDEX DOCD: CPT | Mod: CPTII,S$GLB,, | Performed by: PSYCHIATRY & NEUROLOGY

## 2023-07-18 PROCEDURE — 36415 COLL VENOUS BLD VENIPUNCTURE: CPT | Performed by: PSYCHIATRY & NEUROLOGY

## 2023-07-18 PROCEDURE — 82728 ASSAY OF FERRITIN: CPT | Performed by: PSYCHIATRY & NEUROLOGY

## 2023-07-18 PROCEDURE — 99999 PR PBB SHADOW E&M-EST. PATIENT-LVL III: ICD-10-PCS | Mod: PBBFAC,,, | Performed by: PSYCHIATRY & NEUROLOGY

## 2023-07-18 PROCEDURE — 84466 ASSAY OF TRANSFERRIN: CPT | Performed by: PSYCHIATRY & NEUROLOGY

## 2023-07-18 PROCEDURE — 3074F SYST BP LT 130 MM HG: CPT | Mod: CPTII,S$GLB,, | Performed by: PSYCHIATRY & NEUROLOGY

## 2023-07-18 PROCEDURE — 1159F MED LIST DOCD IN RCRD: CPT | Mod: CPTII,S$GLB,, | Performed by: PSYCHIATRY & NEUROLOGY

## 2023-07-18 PROCEDURE — 83921 ORGANIC ACID SINGLE QUANT: CPT | Performed by: PSYCHIATRY & NEUROLOGY

## 2023-07-18 PROCEDURE — 82607 VITAMIN B-12: CPT | Performed by: PSYCHIATRY & NEUROLOGY

## 2023-07-18 PROCEDURE — 3078F DIAST BP <80 MM HG: CPT | Mod: CPTII,S$GLB,, | Performed by: PSYCHIATRY & NEUROLOGY

## 2023-07-18 PROCEDURE — 3044F HG A1C LEVEL LT 7.0%: CPT | Mod: CPTII,S$GLB,, | Performed by: PSYCHIATRY & NEUROLOGY

## 2023-07-18 PROCEDURE — 3044F PR MOST RECENT HEMOGLOBIN A1C LEVEL <7.0%: ICD-10-PCS | Mod: CPTII,S$GLB,, | Performed by: PSYCHIATRY & NEUROLOGY

## 2023-07-18 PROCEDURE — 1159F PR MEDICATION LIST DOCUMENTED IN MEDICAL RECORD: ICD-10-PCS | Mod: CPTII,S$GLB,, | Performed by: PSYCHIATRY & NEUROLOGY

## 2023-07-18 PROCEDURE — 3074F PR MOST RECENT SYSTOLIC BLOOD PRESSURE < 130 MM HG: ICD-10-PCS | Mod: CPTII,S$GLB,, | Performed by: PSYCHIATRY & NEUROLOGY

## 2023-07-18 RX ORDER — ROPINIROLE 0.25 MG/1
0.25 TABLET, FILM COATED ORAL 2 TIMES DAILY PRN
Qty: 60 TABLET | Refills: 11 | Status: SHIPPED | OUTPATIENT
Start: 2023-07-18 | End: 2024-07-17

## 2023-07-18 NOTE — PROGRESS NOTES
NEUROLOGY CONSULT:  Referring provider: Albert Raza Jr., MD    Date of service: 7/18/2023   11:22 AM   Patient name: Milvia Tuttle  Patient MRN: [id number]    Chief Complaint/Reason for Consultation: RLS      History of Present Illness:   Milvia Tuttle is a 37 y.o.   Restlessness in her legs for several years. Not every day, but occurs quite often. She may shake them or hit them until the feeling subsides. Not just a restless feeling, but will also feel like a cramping sensation. May last 30 minutes or a few hours. She did try OTC leg cramp pills that didn't seem to help very much.   Drinks lots of water during the day aside from one cup of coffee in the morning.  No herbal medications.  No known triggers. Only recently started antidepressant in last week. No herbal or OTC medications otherwise.    Of note, cyst on bottom of right foot in 9/2022. She had a cyst removed from her right foot. Since she first noticed the cyst, along the right side of her foot and up into the back of her calf, she will have a n/t sensation. Propping it up on a pillow helps at time.    Other:  Left eye, about 11 years ago, she had an episode where she had vision changes in her left eye visual field. Had negative MS w/u and determined to be optic migraines. Has occurred a few times since that time.    ROS    Past Medical History:   Diagnosis Date    Abnormal Pap smear of cervix     s/p leep procedure    Anxiety     Depression     Migraine with aura     Vertigo        Past Surgical History:   Procedure Laterality Date    ADENOIDECTOMY         Current Outpatient Medications on File Prior to Visit   Medication Sig Dispense Refill Last Dose    EScitalopram oxalate (LEXAPRO) 5 MG Tab Take 1 tablet (5 mg total) by mouth once daily. 30 tablet 11 Taking    fluticasone propionate (FLONASE) 50 mcg/actuation nasal spray 1 spray (50 mcg total) by Each Nostril route once daily. 11.1 mL 1 Taking    LORazepam (ATIVAN) 0.5 MG tablet Take 1 tablet (0.5  "mg total) by mouth daily as needed for Anxiety (anxiety, dysregulation). 15 tablet 0 Taking    traZODone (DESYREL) 50 MG tablet Take 2 tablets (100 mg total) by mouth nightly as needed for Insomnia (Take 1-2 pills as needed nightly for sleep). 60 tablet 11 Taking    valacyclovir (VALTREX) 1000 MG tablet TK 2 TS PO Q 12 H PRN FOR 1 DAY FOR FEVER BLISTER  1 Taking    levocetirizine (XYZAL) 5 MG tablet Take 1 tablet (5 mg total) by mouth every evening. (Patient not taking: Reported on 7/18/2023) 30 tablet 3 Not Taking       Allergies:   Review of patient's allergies indicates:  No Known Allergies    Family History:       Social history:   Occupation:   Education:   Tobacco:   Alcohol:   Illicit substances:   Living situation:   Disabilities (ADL/IADL):       Examination:    /60   Pulse 70   Ht 5' 8" (1.727 m)   Wt 78.3 kg (172 lb 9.9 oz)   BMI 26.25 kg/m²   .  GENERAL: pleasant, NAD, WDWN. Appropriate mood and affect.   Mental Status: Awake, alert, fully oriented. Patient is a good historian and follows examination well. Normal language function. No neglect.   CRANIAL NERVES:  II: PERRLA.     III, IV, VI: Gaze conjugate, EOMI  V: Sensation intact and symmetric to light touch V1-V3  VII: Symmetric facial motor function bilaterally  VIII: Intact to gross voice  IX: Palate elevates symmetrically  XI: Normal shoulder shrug bilaterally  XII: Tongue protrudes midline  MOTOR: No drift. Normal tone and bulk. Normal strength throughout.  SENSATION:  Light touch: Intact and symmetric in the bilateral upper and lower extremities.  COORDINATION:  Finger to Nose Intact Bilaterally  Rapid alternating movements intact bilaterally  GAIT: normal base and stride. Normal tandem.    Data:   Laboratory: reviewed. Patient also with outside labs on her phone that were reviewed as well; these were performed as part of her recent allergy/dermatologic work up    CSF: n/a    Imaging:  reviewed.   MRI brain w/wo 2017: " unremarkable    Assessment/Plan:   RLS - chronic, progressive x several years. No prior treatment. Discussed possible inciting factors, treatment options, etc.  - iron labs ordered as well as vit B12/folate given n/t in foot  - given frequency, discussed starting requip 0.25mg bid for management of RLS. PARQ discussion held for medication. Patient recently prescribed ativan for anxiety; we discussed that this can be used for treatment of RLS as well. No need to take both if she finds relief with the ativan, which is prescribed by another provider.  - may consider EMG for paresthesias in right foot    Thank you for allowing me to participate in the care of Milvia ELIZABETHMarla.    Time spent on this encounter: 50 minutes. This includes face to face time and non-face to face time preparing to see the patient (eg, review of tests), obtaining and/or reviewing separately obtained history, documenting clinical information in the electronic or other health record, independently interpreting results and communicating results to the patient/family/caregiver, or care coordinator.

## 2023-07-19 ENCOUNTER — PATIENT MESSAGE (OUTPATIENT)
Dept: NEUROLOGY | Facility: CLINIC | Age: 38
End: 2023-07-19
Payer: COMMERCIAL

## 2023-07-20 LAB — VIT B12 SERPL-MCNC: 217 NG/L (ref 180–914)

## 2023-07-22 LAB — METHYLMALONATE SERPL-SCNC: 0.13 NMOL/ML

## 2023-07-25 ENCOUNTER — TELEPHONE (OUTPATIENT)
Dept: PSYCHIATRY | Facility: CLINIC | Age: 38
End: 2023-07-25
Payer: COMMERCIAL

## 2023-07-25 ENCOUNTER — PATIENT MESSAGE (OUTPATIENT)
Dept: PSYCHIATRY | Facility: CLINIC | Age: 38
End: 2023-07-25
Payer: COMMERCIAL

## 2023-08-03 ENCOUNTER — HOSPITAL ENCOUNTER (OUTPATIENT)
Dept: PSYCHIATRY | Facility: HOSPITAL | Age: 38
Discharge: HOME | End: 2023-08-03
Attending: STUDENT IN AN ORGANIZED HEALTH CARE EDUCATION/TRAINING PROGRAM | Admitting: STUDENT IN AN ORGANIZED HEALTH CARE EDUCATION/TRAINING PROGRAM
Payer: COMMERCIAL

## 2023-08-03 DIAGNOSIS — F40.10 SOCIAL ANXIETY DISORDER: Primary | ICD-10-CM

## 2023-08-03 PROCEDURE — 90853 GROUP PSYCHOTHERAPY: CPT

## 2023-08-03 PROCEDURE — 90853 PR GROUP PSYCHOTHERAPY: ICD-10-PCS | Mod: ,,, | Performed by: PSYCHOLOGIST

## 2023-08-03 PROCEDURE — 90853 GROUP PSYCHOTHERAPY: CPT | Mod: ,,, | Performed by: PSYCHOLOGIST

## 2023-08-03 PROCEDURE — 99222 1ST HOSP IP/OBS MODERATE 55: CPT | Mod: ,,, | Performed by: STUDENT IN AN ORGANIZED HEALTH CARE EDUCATION/TRAINING PROGRAM

## 2023-08-03 PROCEDURE — 99222 PR INITIAL HOSPITAL CARE,LEVL II: ICD-10-PCS | Mod: ,,, | Performed by: STUDENT IN AN ORGANIZED HEALTH CARE EDUCATION/TRAINING PROGRAM

## 2023-08-03 RX ORDER — ESCITALOPRAM OXALATE 10 MG/1
10 TABLET ORAL DAILY
Qty: 30 TABLET | Refills: 0 | Status: SHIPPED | OUTPATIENT
Start: 2023-08-03 | End: 2023-09-02
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
## 2023-08-03 NOTE — PROGRESS NOTES
Group Psychotherapy (PhD/LCSW)    Site: [facility]    Clinical status of patient: Intensive Outpatient Program (IOP)    Date: 8/3/2023    Group Focus: Emotion Regulation    Length of service: [id number] - 45-50 minutes    Number of patients in attendance: 8    Referred by: [facility] Treatment Team    Target symptoms: Anxiety and BPD    Patient's response to treatment: Active Listening    Progress toward goals: Progressing adequately    Interval History: Session focus was Emotion Regulation:  Understanding Emotions.  Patients were encouraged to understand what their emotions do for them (motivate them to action, communicate to themselves and others).  Check the Facts was introduced.    Diagnosis:     ICD-10-CM ICD-9-CM   1. Social anxiety disorder  F40.10 300.23        Plan: Continue treatment on [facility]
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
## 2023-08-03 NOTE — PLAN OF CARE
"   08/03/23 5412   Activity/Group Therapy Checklist   Group Other (Comments)  (Communication)   Attendance Attended   Follows Direction Followed directions   Group Interactions/Observations Interacted appropriately;Alert;Sharing;Supportive   Affect/Mood Range Normal range   Affect/Mood Display Appropriate   Goal Progression Progressing      facilitated group and discussed with patients communication exercise. SW paired off pts with another group member and discussed effective communication between each other. One group member was labled the "speaker" while the other group member was the " listener". Pts practiced communicating their images to their partner by providing detailed  information to the listener without any engagement. The listener was able to recreate the image by directions from their partner. DENISE discussed with pts how this exercise could be used in real life scenarios.   "

## 2023-08-04 ENCOUNTER — HOSPITAL ENCOUNTER (OUTPATIENT)
Dept: PSYCHIATRY | Facility: HOSPITAL | Age: 38
Discharge: HOME | End: 2023-08-04
Attending: STUDENT IN AN ORGANIZED HEALTH CARE EDUCATION/TRAINING PROGRAM | Admitting: STUDENT IN AN ORGANIZED HEALTH CARE EDUCATION/TRAINING PROGRAM
Payer: COMMERCIAL

## 2023-08-04 DIAGNOSIS — F40.10 SOCIAL ANXIETY DISORDER: Primary | ICD-10-CM

## 2023-08-04 PROCEDURE — 99232 PR SUBSEQUENT HOSPITAL CARE,LEVL II: ICD-10-PCS | Mod: ,,, | Performed by: STUDENT IN AN ORGANIZED HEALTH CARE EDUCATION/TRAINING PROGRAM

## 2023-08-04 PROCEDURE — 99232 SBSQ HOSP IP/OBS MODERATE 35: CPT | Mod: ,,, | Performed by: STUDENT IN AN ORGANIZED HEALTH CARE EDUCATION/TRAINING PROGRAM

## 2023-08-04 PROCEDURE — 90853 PR GROUP PSYCHOTHERAPY: ICD-10-PCS | Mod: ,,, | Performed by: PSYCHOLOGIST

## 2023-08-04 PROCEDURE — 90853 GROUP PSYCHOTHERAPY: CPT | Mod: ,,, | Performed by: PSYCHOLOGIST

## 2023-08-04 PROCEDURE — 90853 GROUP PSYCHOTHERAPY: CPT

## 2023-08-04 NOTE — PROGRESS NOTES
"Group Psychotherapy (PhD/LCSW)     Site: [facility]     Clinical status of patient: Intensive Outpatient Program (IOP)     Date: 08/04/2023     Group Focus: Psychodynamic Process Group     Length of service: 92230 -60 minutes     Number of patients in attendance: 4     Referred by: [facility] IOP     Target symptoms: Anxiety     Patient's response to treatment: Active Listening and Self-Disclosure     Progress toward goals: Started     Interval History: Patient reported that she was "fine." Topics discussed centered around unhealthy friendships, setting boundaries, and ending relationships when mistreated. She expressed the desire to have more friends, but also valuing the quality of current friendships. She spoke minimally, but remained engaged.       Diagnosis:     ICD-10-CM ICD-9-CM   1. Social anxiety disorder  F40.10 300.23       Plan: Continue  [facility] program        "

## 2023-08-04 NOTE — PROGRESS NOTES
"[facility] PROGRESS NOTE    PATIENT NAME: Milvia Tuttle  MRN: [id number]  ENCOUNTER DATE:  2023 9:44 AM   SITE:  [facility], [facility]  ADMIT DATE: 8/3/2023  Pt Name: Milvia Tuttle   : 1985   MRN: [id number]      HISTORY OF PRESENTING ILLNESS:  Milvia Tuttle is a 37 y.o. female with history of BPD, depression, social anxiety disorder who is admitted to BMU for emotional dysregulation, anxiety, and depression.    Today, pt reports doing well. Feels like she got overwhelmed yesterday in group with emotions. Began feeling hopeless and defeated due to the amount of work she will have to do to get better. Said she is "lazy". Challenged patient on cognitive distortions. Angry about childhood trauma. Working on not internalizing emotions.    No reported SI.     Reviewed goals of treatment with SSRI with patient. Did not take increased dosage of escitalopram yesterday or today; plans on starting it tomorrow.     Risk Parameters:  Patient reports no suicidal ideation  Patient reports no homicidal ideation  Patient reports no self-injurious behavior  Patient reports no violent behavior    Current psych meds  Lexapro 10mg QD  Trazodone 100mg QHS  Ativan 0.5mg PRN severe anxiety  Medication side effects:  None    Current Substance use  Alcohol : None  Nicotine:  None  Illicit's: None  Other Rx controlled substances (Ex-opiates, stimulants etc): None      PAST PSYCHIATRIC, MEDICAL, AND SOCIAL HISTORY REVIEWED  The patient's past medical, family and social history have been reviewed and updated as appropriate within the electronic medical record     MEDICAL REVIEW OF SYSTEMS  History obtained from the patient  General : NO chills or fever  Respiratory: NO cough, shortness of breath  Cardiovascular: NO chest pain, palpitations or racing heart  Gastrointestinal: NO nausea, vomiting, constipation or diarrhea  Neurological: NO confusion, dizziness, headaches or tremors  Psychiatric: please see HPI "     ALL MEDICATIONS:    Current Outpatient Medications:     EScitalopram oxalate (LEXAPRO) 10 MG tablet, Take 1 tablet (10 mg total) by mouth once daily., Disp: 30 tablet, Rfl: 0    EScitalopram oxalate (LEXAPRO) 5 MG Tab, Take 1 tablet (5 mg total) by mouth once daily., Disp: 30 tablet, Rfl: 11    fluticasone propionate (FLONASE) 50 mcg/actuation nasal spray, 1 spray (50 mcg total) by Each Nostril route once daily., Disp: 11.1 mL, Rfl: 1    levocetirizine (XYZAL) 5 MG tablet, Take 1 tablet (5 mg total) by mouth every evening. (Patient not taking: Reported on 7/18/2023), Disp: 30 tablet, Rfl: 3    LORazepam (ATIVAN) 0.5 MG tablet, Take 1 tablet (0.5 mg total) by mouth daily as needed for Anxiety (anxiety, dysregulation)., Disp: 15 tablet, Rfl: 0    rOPINIRole (REQUIP) 0.25 MG tablet, Take 1 tablet (0.25 mg total) by mouth 2 (two) times daily as needed., Disp: 60 tablet, Rfl: 11    traZODone (DESYREL) 50 MG tablet, Take 2 tablets (100 mg total) by mouth nightly as needed for Insomnia (Take 1-2 pills as needed nightly for sleep)., Disp: 60 tablet, Rfl: 11    valacyclovir (VALTREX) 1000 MG tablet, TK 2 TS PO Q 12 H PRN FOR 1 DAY FOR FEVER BLISTER, Disp: , Rfl: 1    ALLERGIES:  Review of patient's allergies indicates:  No Known Allergies    RELEVANT LABS/STUDIES:    Lab Results   Component Value Date    WBC 4.66 05/16/2023    HGB 13.4 05/16/2023    HCT 41.1 05/16/2023    MCV 95 05/16/2023     05/16/2023     BMP  Lab Results   Component Value Date     05/16/2023    K 4.4 05/16/2023     05/16/2023    CO2 25 05/16/2023    BUN 12 05/16/2023    CREATININE 0.8 05/16/2023    CALCIUM 9.4 05/16/2023    ANIONGAP 10 05/16/2023    ESTGFRAFRICA >60.0 02/16/2017    EGFRNONAA >60.0 02/16/2017     Lab Results   Component Value Date    ALT 11 05/16/2023    AST 12 05/16/2023    ALKPHOS 42 (L) 05/16/2023    BILITOT 0.5 05/16/2023     Lab Results   Component Value Date    TSH 1.395 02/16/2017     Lab Results   Component  "Value Date    HGBA1C 5.1 05/16/2023       VITALS  defer to nursing note    PHYSICAL EXAM  General: well developed, well nourished  Neurologic:   Gait: Normal   Psychomotor signs:  No involuntary movements or tremor  AIMS: none    PSYCHIATRIC EXAM:     Mental Status Exam:  Arousal: alert  Sensorium/Orientation: oriented to grossly intact  Behavior/Cooperation: normal, cooperative   Speech: normal tone, normal rate, normal pitch, normal volume  Language: grossly intact  Mood: " ok "   Affect:  dysphoric  Thought Process:  ruminations  Thought Content:   Auditory hallucinations: NO  Visual hallucinations: NO  Paranoia: NO  Delusions:  NO  Suicidal ideation: NO  Homicidal ideation: NO  Attention/Concentration:  intact  Memory:    Recent:  Intact   Remote: Intact  Fund of Knowledge: Intact and Vocabulary appropriate    Abstract reasoning: not assessed  Insight: intact, has awareness of illness  Judgment: behavior is adequate to circumstances       IMPRESSION:    Milvia Tuttle is a 37 y.o. female with history of BPD, anxiety, depression who is admitted to BMU for emotional dysregulation, anxiety.    Status/Progress:  Based on the examination today, the patient's problem(s) is/are inadequately controlled.  New problems have not been presented today.     DIAGNOSES:  BPD  Social Anxiety Disorder  Unspecified insomnia                                                                                                                                                                                                                                                                                                                                                                                                                     PLAN:    1) Continue BMU program with group and individual therapies.     2) Psych Med:  Continue escitalopram 10mg daily  Continue lorazepam PRN and trazodone as prescribed by Dr. Mcknight.    Discussed with patient " informed consent, risks vs. benefits, alternative treatments, side effect profile and the inherent unpredictability of individual responses to these treatments. Answered any questions patient may have had. The patient expresses understanding of the above and displays the capacity to agree with this current plan     3) Other: Labs/medical problems/ collateral- none needed        Josh Carbone MD  [employer] [facility] PGY4  8/4/2023 9:44 AM

## 2023-08-04 NOTE — PLAN OF CARE
08/04/23 1601   Activity/Group Therapy Checklist   Group Meditation/Relaxation   Attendance Attended   Follows Direction Followed directions   Group Interactions/Observations Interacted appropriately;Alert;Sharing;Supportive   Affect/Mood Range Normal range   Affect/Mood Display Appropriate   Goal Progression Progressing     Sw facilitated stretch/ relaxation group.

## 2023-08-04 NOTE — TREATMENT PLAN
"[facility]  MENTAL WELLNESS PROGRAM  INTERDISCIPLINARY TREATMENT PLAN  INTENSIVE OUTPATIENT     INTERDISCIPLINARY  TREATMENT TEAM:    Saw Capone M.D., Psychiatrist     Leah Mcknight M.D., Psychiatrist     Melita Soria, Ph.D., Clinical Psychologist    Angela Mejia, [facility], Licensed Master Social Worker    FLORENTIN Rojas, Registered     Elias Victoria LPN, Licensed Practical Nurse      Resident: Josh Carbone MD     (Signatures scanned into record separately).      ESTIMATED LOS:  2 weeks      The patient has reviewed the treatment plan with staff and has signed the "Patient Responsibilities" form.    (Patient signature scanned into record separately).       TREATMENT PLAN    DIAGNOSIS:    BPD  Unspecified insomnia  Social anxiety disorder      Patient Education Needs/Barriers to Learning (i.e., Language, Reading, Comprehension): None        Support/Advocacy Services/Needs (i.e., Financial, Transportation, Medications): None         Community Resources (i.e., Alcoholics Anonymous, Al Anon): None    Patients Identified Goals:  Who Am I ?  2.  Self [place] and Confidence   3. Make my mind stop with intrusive thoughts - I need peace.      Coping Skills:  I seclude myself in the guest bedroom   2.   I avoid as many people as possible      Strengths:  I do have a good heart   2.   I care  a lot   3.   I always want to he right thing    Limitations:  My mind    Goals and Objectives:  1. Goal: Attend and participate in all groups   Objective measure: Progress notes indicating active listening,    self-disclosure, feedback   Time frame to reach goal: Each day    2. Goal: Medication management   Objective measure: Physician progress note indicating improved medication status   Time frame to reach goal: By discharge    3. Goal: Reduce depression   Objective measure: Physician progress note indicating depression is improved   Time frame to reach goal: By discharge    4.  Goal: Reduce " anxiety   Objective measure: Physician progress note indicating anxiety is improved   Time frame to reach goal: By discharge    5. Goal: Develop stress coping skills   Objective measure: Patient self-report of improved coping   Time frame to reach goal: By discharge          Group Interventions:    Psychodynamic Group Psychotherapy - 1 hour, 5 times per week  Goals: 1. Utilize group empathy and support for problem solving; 2. Apply stress management, communication, and assertiveness skills to personal issues; 3. Discuss mental health symptoms and explore strategies for coping; 4. Discuss ways to change lifestyle to maintain better emotional health.    Communication Skills - 1 hour, 2 times per week  Goals: 1. Learn rules of effective communication; 2. Improve listening skills; 3. Practice clear communication.    Nutrition and Health - 1 hour, 1 time per week  Goals: 1. Explore impact that nutrition has on health; 2. Identify positive nutritional choices; 3. Explore how nutrition relates to stress tolerance.    Personal Growth - 1 hour, 2 times per week  Goals: 1. Discuss the development of self; 2. Create personal awareness and insight; 3. Explore a variety of psycho-educational techniques.    Promoting Healthy Lifestyles - 1 hour, 1 time per week  Goals: 1. Understand the Biopsychosocial Model of Health; 2. Develop insight into how mental health can impact other dimensions of health; 3. Develop appropriate health promotion strategies.    Acceptance and Commitment Therapy (ACT)- 1 hour, 1 time per week  Goals: 1. Learn an action-oriented psychotherapy called ACT; 2. Focus on identifying, challenging, and clarifying values systems and beliefs; 3. Explore how values oriented actions can lead to emotional well-being.    Relationship Dynamics - 1 hour, 1 time per week  Goals: 1. Learn about factors that shape relationships; 2. Understand the central role of relationships in personal well-being; 3. Learn how to improve  all relationships.    Relaxation Training - 1 hour, 3 times per week  Goals: 1. Learn about and implement various techniques for releasing physical tension from the body.    Spirituality - 1 hour, 1 time per week  Goals: 1. Discuss and reflect on the process of seeking peace and comfort; 2. Identify healthy ways of exploring spirituality.    Stress Management - 1 hour, 4 times per week  Goals: 1. Identify types and levels of stress; 2. Identify and change maladaptive beliefs and behaviors; 3. Identify and practice techniques of stress management.    DBT- 1 hour, 4 times per week  Goals: 1. Discuss emotion regulation and Interpersonal Effectiveness Skills and practice mindfulness; 2. Identify and change maladaptive beliefs and behaviors; Identify and practice techniques of DBT and mindfulness.

## 2023-08-04 NOTE — PROGRESS NOTES
Group Psychotherapy (PhD/LCSW)     Site: [facility]     Clinical status of patient: Intensive Outpatient Program (IOP)     Date: 08/03/2023     Group Focus: Psychodynamic Process Group     Length of service: 72285 -60 minutes     Number of patients in attendance: 4     Referred by: [facility] IOP     Target symptoms: Anxiety     Patient's response to treatment: Active Listening and Self-Disclosure     Progress toward goals: Started     Interval History: Patient briefly introduced herself to the group and discussed presenting concerns. She engaged in art expression with other members of the group. She spoke minimally, but remained engaged.       Diagnosis:     ICD-10-CM ICD-9-CM   1. Social anxiety disorder  F40.10 300.23       Plan: Continue  [facility] program
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
## 2023-08-04 NOTE — PLAN OF CARE
08/04/23 1505   Activity/Group Therapy Checklist   Group Activity   Attendance Attended   Follows Direction Followed directions   Group Interactions/Observations Interacted appropriately;Alert;Sharing   Affect/Mood Range Normal range   Affect/Mood Display Appropriate   Goal Progression Progressing

## 2023-08-07 ENCOUNTER — HOSPITAL ENCOUNTER (OUTPATIENT)
Dept: PSYCHIATRY | Facility: HOSPITAL | Age: 38
Discharge: HOME | End: 2023-08-07
Attending: STUDENT IN AN ORGANIZED HEALTH CARE EDUCATION/TRAINING PROGRAM | Admitting: STUDENT IN AN ORGANIZED HEALTH CARE EDUCATION/TRAINING PROGRAM
Payer: COMMERCIAL

## 2023-08-07 VITALS
RESPIRATION RATE: 18 BRPM | DIASTOLIC BLOOD PRESSURE: 59 MMHG | SYSTOLIC BLOOD PRESSURE: 121 MMHG | TEMPERATURE: 98 F | HEART RATE: 76 BPM

## 2023-08-07 PROCEDURE — 90853 GROUP PSYCHOTHERAPY: CPT

## 2023-08-07 NOTE — PROGRESS NOTES
[facility] MENTAL [facility]LL[facility] PRO[facility] PROGRESS NOTE    PATIENT NAME: Milvia Tuttle  MRN: [id number]  ENCOUNTER DATE:  2023 9:44 AM   SITE:  U unit, [facility]  ADMIT DATE: 8/3/2023  Pt Name: Milvia Tuttle   : 1985   MRN: [id number]      HISTORY OF PRESENTING ILLNESS:  Milvia Tuttle is a 37 y.o. female with history of BPD, depression, social anxiety disorder who is admitted to BMU for emotional dysregulation, anxiety, and depression.    Today, pt reports doing well. Reports her weekend was good. Spent time with her , did things around the house like gardening and cleaning.  surprised her with tickets to see [employer] [address] [address] for her birthday. Started higher dosage of escitalopram Saturday. No reported adverse effects at this time.     Group sessions, per patient, not going very well. Does not feel that she can open up freely due to others talking for most of the session. Encouraged patient to participate freely in groups.     No reported SI.     Risk Parameters:  Patient reports no suicidal ideation  Patient reports no homicidal ideation  Patient reports no self-injurious behavior  Patient reports no violent behavior    Current psych meds  Lexapro 10mg QD  Trazodone 100mg QHS  Ativan 0.5mg PRN severe anxiety  Medication side effects:  None    Current Substance use  Alcohol : None  Nicotine:  None  Illicit's: None  Other Rx controlled substances (Ex-opiates, stimulants etc): None      PAST PSYCHIATRIC, MEDICAL, AND SOCIAL HISTORY REVIEWED  The patient's past medical, family and social history have been reviewed and updated as appropriate within the electronic medical record     MEDICAL REVIEW OF SYSTEMS  History obtained from the patient  General : NO chills or fever  Respiratory: NO cough, shortness of breath  Cardiovascular: NO chest pain, palpitations or racing heart  Gastrointestinal: NO nausea, vomiting, constipation or diarrhea  Neurological: NO confusion, dizziness, headaches or  tremors  Psychiatric: please see HPI     ALL MEDICATIONS:    Current Outpatient Medications:     EScitalopram oxalate (LEXAPRO) 10 MG tablet, Take 1 tablet (10 mg total) by mouth once daily., Disp: 30 tablet, Rfl: 0    EScitalopram oxalate (LEXAPRO) 5 MG Tab, Take 1 tablet (5 mg total) by mouth once daily., Disp: 30 tablet, Rfl: 11    fluticasone propionate (FLONASE) 50 mcg/actuation nasal spray, 1 spray (50 mcg total) by Each Nostril route once daily., Disp: 11.1 mL, Rfl: 1    levocetirizine (XYZAL) 5 MG tablet, Take 1 tablet (5 mg total) by mouth every evening. (Patient not taking: Reported on 7/18/2023), Disp: 30 tablet, Rfl: 3    LORazepam (ATIVAN) 0.5 MG tablet, Take 1 tablet (0.5 mg total) by mouth daily as needed for Anxiety (anxiety, dysregulation)., Disp: 15 tablet, Rfl: 0    rOPINIRole (REQUIP) 0.25 MG tablet, Take 1 tablet (0.25 mg total) by mouth 2 (two) times daily as needed., Disp: 60 tablet, Rfl: 11    traZODone (DESYREL) 50 MG tablet, Take 2 tablets (100 mg total) by mouth nightly as needed for Insomnia (Take 1-2 pills as needed nightly for sleep)., Disp: 60 tablet, Rfl: 11    valacyclovir (VALTREX) 1000 MG tablet, TK 2 TS PO Q 12 H PRN FOR 1 DAY FOR FEVER BLISTER, Disp: , Rfl: 1    ALLERGIES:  Review of patient's allergies indicates:  No Known Allergies    RELEVANT LABS/STUDIES:    Lab Results   Component Value Date    WBC 4.66 05/16/2023    HGB 13.4 05/16/2023    HCT 41.1 05/16/2023    MCV 95 05/16/2023     05/16/2023     BMP  Lab Results   Component Value Date     05/16/2023    K 4.4 05/16/2023     05/16/2023    CO2 25 05/16/2023    BUN 12 05/16/2023    CREATININE 0.8 05/16/2023    CALCIUM 9.4 05/16/2023    ANIONGAP 10 05/16/2023    ESTGFRAFRICA >60.0 02/16/2017    EGFRNONAA >60.0 02/16/2017     Lab Results   Component Value Date    ALT 11 05/16/2023    AST 12 05/16/2023    ALKPHOS 42 (L) 05/16/2023    BILITOT 0.5 05/16/2023     Lab Results   Component Value Date    TSH 1.395  "02/16/2017     Lab Results   Component Value Date    HGBA1C 5.1 05/16/2023       VITALS  defer to nursing note    PHYSICAL EXAM  General: well developed, well nourished  Neurologic:   Gait: Normal   Psychomotor signs:  No involuntary movements or tremor  AIMS: none    PSYCHIATRIC EXAM:     Mental Status Exam:  Arousal: alert  Sensorium/Orientation: oriented to grossly intact  Behavior/Cooperation: normal, cooperative   Speech: normal tone, normal rate, normal pitch, normal volume  Language: grossly intact  Mood: " ok "   Affect: appropriate  Thought Process:  ruminations  Thought Content:   Auditory hallucinations: NO  Visual hallucinations: NO  Paranoia: NO  Delusions:  NO  Suicidal ideation: NO  Homicidal ideation: NO  Attention/Concentration:  intact  Memory:    Recent:  Intact   Remote: Intact  Fund of Knowledge: Intact and Vocabulary appropriate    Abstract reasoning: not assessed  Insight: intact, has awareness of illness  Judgment: behavior is adequate to circumstances       IMPRESSION:    Milvia Tuttle is a 37 y.o. female with history of BPD, anxiety, depression who is admitted to BMU for emotional dysregulation, anxiety.    Status/Progress:  Based on the examination today, the patient's problem(s) is/are inadequately controlled.  New problems have not been presented today.     DIAGNOSES:  BPD  Social Anxiety Disorder  Unspecified insomnia                                                                                                                                                                                                                                                                                                                                                                                                                     PLAN:    1) Continue BMU program with group and individual therapies.     2) Psych Med:  Continue escitalopram 10mg daily  Continue lorazepam PRN and trazodone as prescribed " by Dr. Mcknight.    Discussed with patient informed consent, risks vs. benefits, alternative treatments, side effect profile and the inherent unpredictability of individual responses to these treatments. Answered any questions patient may have had. The patient expresses understanding of the above and displays the capacity to agree with this current plan     3) Other: Labs/medical problems/ collateral- none needed        Josh Carbone MD  [employer] [facility] PGY4  8/7/2023 9:44 AM

## 2023-08-07 NOTE — PLAN OF CARE
08/07/23 1515   Activity/Group Therapy Checklist   Group Other (Comments)  (Processing)   Attendance Attended   Follows Direction Followed directions   Group Interactions/Observations Interacted appropriately;Alert;Sharing;Supportive   Affect/Mood Range Normal range   Affect/Mood Display Appropriate   Goal Progression Progressing

## 2023-08-08 ENCOUNTER — HOSPITAL ENCOUNTER (OUTPATIENT)
Dept: PSYCHIATRY | Facility: HOSPITAL | Age: 38
Discharge: HOME | End: 2023-08-08
Attending: STUDENT IN AN ORGANIZED HEALTH CARE EDUCATION/TRAINING PROGRAM | Admitting: STUDENT IN AN ORGANIZED HEALTH CARE EDUCATION/TRAINING PROGRAM
Payer: COMMERCIAL

## 2023-08-08 DIAGNOSIS — F40.10 SOCIAL ANXIETY DISORDER: ICD-10-CM

## 2023-08-08 DIAGNOSIS — F60.3 BORDERLINE PERSONALITY DISORDER: Primary | ICD-10-CM

## 2023-08-08 PROCEDURE — 90833 PSYTX W PT W E/M 30 MIN: CPT | Mod: ,,, | Performed by: STUDENT IN AN ORGANIZED HEALTH CARE EDUCATION/TRAINING PROGRAM

## 2023-08-08 PROCEDURE — 90853 PR GROUP PSYCHOTHERAPY: ICD-10-PCS | Mod: ,,, | Performed by: PSYCHOLOGIST

## 2023-08-08 PROCEDURE — 99232 SBSQ HOSP IP/OBS MODERATE 35: CPT | Mod: ,,, | Performed by: STUDENT IN AN ORGANIZED HEALTH CARE EDUCATION/TRAINING PROGRAM

## 2023-08-08 PROCEDURE — 90853 GROUP PSYCHOTHERAPY: CPT

## 2023-08-08 PROCEDURE — 90833 PR PSYCHOTHERAPY W/PATIENT W/E&M, 30 MIN (ADD ON): ICD-10-PCS | Mod: ,,, | Performed by: STUDENT IN AN ORGANIZED HEALTH CARE EDUCATION/TRAINING PROGRAM

## 2023-08-08 PROCEDURE — 99232 PR SUBSEQUENT HOSPITAL CARE,LEVL II: ICD-10-PCS | Mod: ,,, | Performed by: STUDENT IN AN ORGANIZED HEALTH CARE EDUCATION/TRAINING PROGRAM

## 2023-08-08 PROCEDURE — 90853 GROUP PSYCHOTHERAPY: CPT | Mod: ,,, | Performed by: PSYCHOLOGIST

## 2023-08-08 NOTE — PROGRESS NOTES
"Group Psychotherapy (PhD/LCSW)     Site: [facility]     Clinical status of patient: Intensive Outpatient Program (IOP)     Date: 08/08/2023     Group Focus: Psychodynamic Process Group     Length of service: 17648 -60 minutes     Number of patients in attendance: 6     Referred by: [facility] IOP     Target symptoms:Mood Disorder and  Anxiety     Patient's response to treatment: Active Listening and Self-Disclosure     Progress toward goals: Progressing Adequately     Interval History: Patient reported that she openly shared during previous groups and was experiencing "vulnerability fatigue." She remained engaged, but spoke minimally. She also shared her experiences with writing down emotions and thoughts in a journal.      Diagnosis:     ICD-10-CM ICD-9-CM   1. Borderline personality disorder  F60.3 301.83   2. Social anxiety disorder  F40.10 300.23       Plan: Continue  [facility] program        "

## 2023-08-08 NOTE — PLAN OF CARE
08/08/23 1402   Activity/Group Therapy Checklist   Group Activity   Attendance Attended   Follows Direction Followed directions   Group Interactions/Observations Interacted appropriately;Alert;Sharing   Affect/Mood Range Normal range   Affect/Mood Display Appropriate   Goal Progression Progressing
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
## 2023-08-09 ENCOUNTER — HOSPITAL ENCOUNTER (OUTPATIENT)
Dept: PSYCHIATRY | Facility: HOSPITAL | Age: 38
Discharge: HOME | End: 2023-08-09
Attending: STUDENT IN AN ORGANIZED HEALTH CARE EDUCATION/TRAINING PROGRAM | Admitting: STUDENT IN AN ORGANIZED HEALTH CARE EDUCATION/TRAINING PROGRAM
Payer: COMMERCIAL

## 2023-08-09 DIAGNOSIS — F60.3 BORDERLINE PERSONALITY DISORDER: Primary | ICD-10-CM

## 2023-08-09 DIAGNOSIS — F40.10 SOCIAL ANXIETY DISORDER: ICD-10-CM

## 2023-08-09 PROCEDURE — 90853 GROUP PSYCHOTHERAPY: CPT

## 2023-08-09 PROCEDURE — 90853 PR GROUP PSYCHOTHERAPY: ICD-10-PCS | Mod: ,,, | Performed by: PSYCHOLOGIST

## 2023-08-09 PROCEDURE — 90853 GROUP PSYCHOTHERAPY: CPT | Mod: ,,, | Performed by: PSYCHOLOGIST
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
## 2023-08-09 NOTE — PLAN OF CARE
08/09/23 1351   Activity/Group Therapy Checklist   Group Other (Comments)  (Boundaries)   Attendance Attended   Follows Direction Followed directions   Group Interactions/Observations Interacted appropriately;Alert;Supportive   Affect/Mood Range Normal range   Affect/Mood Display Appropriate   Goal Progression Progressing

## 2023-08-09 NOTE — PROGRESS NOTES
Group Psychotherapy (PhD/LCSW)    Site: [facility]    Clinical status of patient: Intensive Outpatient Program (IOP)    Date: 8/9/2023    Group Focus: Distress Tolerance    Length of service: 89134 - 45-50 minutes    Number of patients in attendance: 8    Referred by: [facility]    Target symptoms: Anxiety and BPD    Patient's response to treatment: Active Listening, Self Disclosure     Progress toward goals: Progressing adequately    Interval History: Session focus was Distress Tolerance: Self-Soothe.  Patients were encouraged to use crisis survival skills to reduce intensity of distress.  Each patient entified something soothing for all five senses.  Diagnosis:       ICD-10-CM ICD-9-CM   1. Borderline personality disorder  F60.3 301.83   2. Social anxiety disorder  F40.10 300.23     Plan: Continue treatment on W

## 2023-08-09 NOTE — PROGRESS NOTES
Group Psychotherapy (PhD/LCSW)    Site: [facility]    Clinical status of patient: Intensive Outpatient Program (IOP)    Date: 8/8/2023    Group Focus: Interpersonal Effectiveness    Length of service: 35532 - 45-50 minutes    Number of patients in attendance: 8    Referred by: [facility] Team    Target symptoms: Anxiety and BPD    Patient's response to treatment: Active Listening    Progress toward goals: Progressing adequately    Interval History: Session focus was Interpersonal Effectiveness Skills, specifically introduction to the goals of Interpersonal Effectiveness and objective effectiveness (ADY).    Diagnosis:     ICD-10-CM ICD-9-CM   1. Borderline personality disorder  F60.3 301.83   2. Social anxiety disorder  F40.10 300.23        Plan: Continue treatment on [facility]

## 2023-08-10 ENCOUNTER — HOSPITAL ENCOUNTER (OUTPATIENT)
Dept: PSYCHIATRY | Facility: HOSPITAL | Age: 38
Discharge: HOME | End: 2023-08-10
Attending: STUDENT IN AN ORGANIZED HEALTH CARE EDUCATION/TRAINING PROGRAM | Admitting: STUDENT IN AN ORGANIZED HEALTH CARE EDUCATION/TRAINING PROGRAM
Payer: COMMERCIAL

## 2023-08-10 DIAGNOSIS — F60.3 BORDERLINE PERSONALITY DISORDER: Primary | ICD-10-CM

## 2023-08-10 DIAGNOSIS — F40.10 SOCIAL ANXIETY DISORDER: ICD-10-CM

## 2023-08-10 PROCEDURE — 90853 PR GROUP PSYCHOTHERAPY: ICD-10-PCS | Mod: ,,, | Performed by: PSYCHOLOGIST

## 2023-08-10 PROCEDURE — 90853 GROUP PSYCHOTHERAPY: CPT

## 2023-08-10 PROCEDURE — 90853 GROUP PSYCHOTHERAPY: CPT | Mod: ,,, | Performed by: PSYCHOLOGIST

## 2023-08-10 NOTE — PROGRESS NOTES
Group Psychotherapy (PhD/LCSW)    Site: [facility]    Clinical status of patient: Intensive Outpatient Program (IOP)    Date: 8/10/2023    Group Focus: Emotion Regulation    Length of service: 59518 - 45-50 minutes    Number of patients in attendance: 9    Referred by: [facility] Team    Target symptoms: Anxiety and BPD    Patient's response to treatment: Active Listening, Self Disclosure     Progress toward goals: Progressing adequately    Interval History: Session focus was Emotion Regulation: Check the Facts.  Patients were encouraged to understand what their emotions do for them (motivate them to action, communicate to themselves and others). They were encouraged to check the facts to ensure their emotion intensity fits the situation.      Diagnosis:       ICD-10-CM ICD-9-CM   1. Borderline personality disorder  F60.3 301.83   2. Social anxiety disorder  F40.10 300.23       Plan: Continue treatment on [facility]

## 2023-08-10 NOTE — PROGRESS NOTES
"Group Psychotherapy (PhD/LCSW)     Site: [facility]     Clinical status of patient: Intensive Outpatient Program (IOP)     Date: 08/09/2023     Group Focus: Psychodynamic Process Group     Length of service: 48997 -60 minutes     Number of patients in attendance: 6     Referred by: [facility]     Target symptoms:Mood Disorder and  Anxiety     Patient's response to treatment: Active Listening and Self-Disclosure     Progress toward goals: Progressing adequately     Interval History: Patient appeared engaged, but spoke minimally during the group. She reported that she felt "blah" and was not sure of the cause. The group was focused on exploring values and cultivating personal qualities across multiple domains such as relationships, work/education, personal growth/health, and leisure.       Diagnosis:     ICD-10-CM ICD-9-CM   1. Borderline personality disorder  F60.3 301.83   2. Social anxiety disorder  F40.10 300.23       Plan: Continue  W program        "

## 2023-08-11 ENCOUNTER — HOSPITAL ENCOUNTER (OUTPATIENT)
Dept: PSYCHIATRY | Facility: HOSPITAL | Age: 38
Discharge: HOME | End: 2023-08-11
Attending: STUDENT IN AN ORGANIZED HEALTH CARE EDUCATION/TRAINING PROGRAM | Admitting: STUDENT IN AN ORGANIZED HEALTH CARE EDUCATION/TRAINING PROGRAM
Payer: COMMERCIAL

## 2023-08-11 VITALS
RESPIRATION RATE: 18 BRPM | HEART RATE: 92 BPM | TEMPERATURE: 98 F | SYSTOLIC BLOOD PRESSURE: 125 MMHG | DIASTOLIC BLOOD PRESSURE: 63 MMHG

## 2023-08-11 DIAGNOSIS — F40.10 SOCIAL ANXIETY DISORDER: ICD-10-CM

## 2023-08-11 DIAGNOSIS — F60.3 BORDERLINE PERSONALITY DISORDER: Primary | ICD-10-CM

## 2023-08-11 PROCEDURE — 90853 PR GROUP PSYCHOTHERAPY: ICD-10-PCS | Mod: ,,, | Performed by: PSYCHOLOGIST

## 2023-08-11 PROCEDURE — 99232 PR SUBSEQUENT HOSPITAL CARE,LEVL II: ICD-10-PCS | Mod: ,,, | Performed by: PSYCHIATRY & NEUROLOGY

## 2023-08-11 PROCEDURE — 90853 GROUP PSYCHOTHERAPY: CPT | Mod: ,,, | Performed by: PSYCHOLOGIST

## 2023-08-11 PROCEDURE — 90853 GROUP PSYCHOTHERAPY: CPT

## 2023-08-11 PROCEDURE — 99232 SBSQ HOSP IP/OBS MODERATE 35: CPT | Mod: ,,, | Performed by: PSYCHIATRY & NEUROLOGY

## 2023-08-11 NOTE — PROGRESS NOTES
[facility] PROGRESS NOTE    PATIENT NAME: Milvia Tuttle  MRN: [id number]  ENCOUNTER DATE:  2023 9:44 AM   SITE:  [facility], [facility]  ADMIT DATE: 8/3/2023  Pt Name: Milvia Tuttle   : 1985   MRN: [id number]      HISTORY OF PRESENTING ILLNESS:  Milvia Tuttle is a 37 y.o. female with history of BPD, depression, social anxiety disorder who is admitted to BMU for emotional dysregulation, anxiety, and depression.    Reviewed all notes in past 24h. Per documentation, patient has been well-engaged in groups and is progressing adequately.    Today, pt reports doing well. She said that Wednesday was a harder day for her emotionally but since then she has been doing very well as far as her mood. Has noticed she is smiling more. Tonight is going to a  with her . No other specific plans for the weekend. No SI, HI. No new adverse effects reported from current medications.    Risk Parameters:  Patient reports no suicidal ideation  Patient reports no homicidal ideation  Patient reports no self-injurious behavior  Patient reports no violent behavior    Current psych meds  Lexapro 10mg QD  Trazodone 100mg QHS  Ativan 0.5mg PRN severe anxiety  Medication side effects:  None    Current Substance use  Alcohol : None  Nicotine:  None  Illicit's: None  Other Rx controlled substances (Ex-opiates, stimulants etc): None      PAST PSYCHIATRIC, MEDICAL, AND SOCIAL HISTORY REVIEWED  The patient's past medical, family and social history have been reviewed and updated as appropriate within the electronic medical record     MEDICAL REVIEW OF SYSTEMS  History obtained from the patient  General : NO chills or fever  Respiratory: NO cough, shortness of breath  Cardiovascular: NO chest pain, palpitations or racing heart  Gastrointestinal: NO nausea, vomiting, constipation or diarrhea  Neurological: NO confusion, dizziness, headaches or tremors  Psychiatric: please see HPI     ALL  MEDICATIONS:    Current Outpatient Medications:     EScitalopram oxalate (LEXAPRO) 10 MG tablet, Take 1 tablet (10 mg total) by mouth once daily., Disp: 30 tablet, Rfl: 0    EScitalopram oxalate (LEXAPRO) 5 MG Tab, Take 1 tablet (5 mg total) by mouth once daily., Disp: 30 tablet, Rfl: 11    fluticasone propionate (FLONASE) 50 mcg/actuation nasal spray, 1 spray (50 mcg total) by Each Nostril route once daily., Disp: 11.1 mL, Rfl: 1    levocetirizine (XYZAL) 5 MG tablet, Take 1 tablet (5 mg total) by mouth every evening. (Patient not taking: Reported on 7/18/2023), Disp: 30 tablet, Rfl: 3    LORazepam (ATIVAN) 0.5 MG tablet, Take 1 tablet (0.5 mg total) by mouth daily as needed for Anxiety (anxiety, dysregulation)., Disp: 15 tablet, Rfl: 0    rOPINIRole (REQUIP) 0.25 MG tablet, Take 1 tablet (0.25 mg total) by mouth 2 (two) times daily as needed., Disp: 60 tablet, Rfl: 11    traZODone (DESYREL) 50 MG tablet, Take 2 tablets (100 mg total) by mouth nightly as needed for Insomnia (Take 1-2 pills as needed nightly for sleep)., Disp: 60 tablet, Rfl: 11    valacyclovir (VALTREX) 1000 MG tablet, TK 2 TS PO Q 12 H PRN FOR 1 DAY FOR FEVER BLISTER, Disp: , Rfl: 1    ALLERGIES:  Review of patient's allergies indicates:  No Known Allergies    RELEVANT LABS/STUDIES:    Lab Results   Component Value Date    WBC 4.66 05/16/2023    HGB 13.4 05/16/2023    HCT 41.1 05/16/2023    MCV 95 05/16/2023     05/16/2023     BMP  Lab Results   Component Value Date     05/16/2023    K 4.4 05/16/2023     05/16/2023    CO2 25 05/16/2023    BUN 12 05/16/2023    CREATININE 0.8 05/16/2023    CALCIUM 9.4 05/16/2023    ANIONGAP 10 05/16/2023    ESTGFRAFRICA >60.0 02/16/2017    EGFRNONAA >60.0 02/16/2017     Lab Results   Component Value Date    ALT 11 05/16/2023    AST 12 05/16/2023    ALKPHOS 42 (L) 05/16/2023    BILITOT 0.5 05/16/2023     Lab Results   Component Value Date    TSH 1.395 02/16/2017     Lab Results   Component Value  "Date    HGBA1C 5.1 05/16/2023       VITALS  defer to nursing note    PHYSICAL EXAM  General: well developed, well nourished  Neurologic:   Gait: Normal   Psychomotor signs:  No involuntary movements or tremor  AIMS: none    PSYCHIATRIC EXAM:     Mental Status Exam:  Arousal: alert  Sensorium/Orientation: grossly intact  Behavior/Cooperation: normal, cooperative   Speech: normal tone, normal rate, normal pitch, normal volume  Language: grossly intact  Mood: "good"   Affect: appropriate and smiling  Thought Process: normal and logical  Thought Content:   Auditory hallucinations: NO  Visual hallucinations: NO  Paranoia: NO  Delusions:  NO  Suicidal ideation: NO  Homicidal ideation: NO  Attention/Concentration:  intact  Memory:    Recent:  Intact   Remote: Intact  Fund of Knowledge: Intact and Vocabulary appropriate    Abstract reasoning: not assessed  Insight: intact, has awareness of illness  Judgment: behavior is adequate to circumstances       IMPRESSION:    Milvia Tuttle is a 37 y.o. female with history of BPD, anxiety, depression who is admitted to BMU for emotional dysregulation, anxiety.    Status/Progress:  Based on the examination today, the patient's problem(s) is/are inadequately controlled.  New problems have not been presented today.     DIAGNOSES:  BPD  Social Anxiety Disorder  Unspecified insomnia                                                                                                                                                                                                                                                                                                                                                                                                                     PLAN:    1) Continue BMU program with group and individual therapies.     2) Psych Med:  Continue escitalopram 10mg daily  Continue lorazepam PRN and trazodone as prescribed by Dr. Mcknight.    Discussed with " patient informed consent, risks vs. benefits, alternative treatments, side effect profile and the inherent unpredictability of individual responses to these treatments. Answered any questions patient may have had. The patient expresses understanding of the above and displays the capacity to agree with this current plan     3) Other: Labs/medical problems/ collateral- none needed        Josh Carbone MD  [employer] [facility] PGY4  8/11/2023 9:44 AM

## 2023-08-11 NOTE — PLAN OF CARE
08/11/23 1355   Activity/Group Therapy Checklist   Group Goals/Reflection   Attendance Attended   Follows Direction Followed directions   Group Interactions/Observations Interacted appropriately;Alert;Sharing   Affect/Mood Range Normal range   Affect/Mood Display Appropriate   Goal Progression Progressing

## 2023-08-11 NOTE — PLAN OF CARE
08/11/23 1340   Activity/Group Therapy Checklist   Group Meditation/Relaxation   Attendance Attended   Follows Direction Followed directions   Group Interactions/Observations Interacted appropriately;Alert;Sharing;Supportive   Affect/Mood Range Normal range   Affect/Mood Display Appropriate   Goal Progression Progressing     SW facilitated stretch/ relaxation group.

## 2023-08-11 NOTE — PLAN OF CARE
08/11/23 1317   Activity/Group Therapy Checklist   Group Other (Comments)  (Stress exploration)   Attendance Attended   Follows Direction Followed directions   Group Interactions/Observations Interacted appropriately;Alert;Sharing;Supportive   Affect/Mood Range Normal range   Affect/Mood Display Appropriate   Goal Progression Progressing      discussed stress exploration with the group. Pt's identified factors that contribute to their stress: ( daily hassles, major life changes, and life circumstances) and factors that protect against stress: ( daily uplifts, healthy coping strategies and protective factors) to help find balance in their lives and overall improve their mental health.

## 2023-08-13 NOTE — PROGRESS NOTES
Group Psychotherapy (PhD/LCSW)     Site: [facility]     Clinical status of patient: Intensive Outpatient Program (IOP)     Date: 08/11/2023     Group Focus: Psychodynamic Process Group     Length of service: 66067 -60 minutes     Number of patients in attendance: 6     Referred by: [facility]     Target symptoms:Mood Disorder and  Anxiety     Patient's response to treatment: Active Listening and Self-Disclosure     Progress toward goals: Progressing adequately     Interval History: Patient reported that she was in a better mood as compared to previous days. She also brought candy for the group to share. She discussed goals for self-care over the weekend and provided another member of the group encouragement.     Diagnosis:     ICD-10-CM ICD-9-CM   1. Borderline personality disorder  F60.3 301.83   2. Social anxiety disorder  F40.10 300.23       Plan: Continue  W program

## 2023-08-13 NOTE — PROGRESS NOTES
Group Psychotherapy (PhD/LCSW)     Site: [facility]     Clinical status of patient: Intensive Outpatient Program (IOP)     Date: 08/10/2023     Group Focus: Psychodynamic Process Group     Length of service: 94190 -60 minutes     Number of patients in attendance: 7     Referred by: [facility]     Target symptoms:Mood Disorder and  Anxiety     Patient's response to treatment: Active Listening and Self-Disclosure     Progress toward goals: Progressing adequately     Interval History: Patient reported that she implemented grounding skills last night and spent time outside in the grass. She reported improved mood and increased optimism. She shared overall experience with the group. She also discussed activities that she engages in independently to improve mood. She remained engaged.    Diagnosis:     ICD-10-CM ICD-9-CM   1. Borderline personality disorder  F60.3 301.83   2. Social anxiety disorder  F40.10 300.23       Plan: Continue  [facility] program

## 2023-08-14 ENCOUNTER — HOSPITAL ENCOUNTER (OUTPATIENT)
Dept: PSYCHIATRY | Facility: HOSPITAL | Age: 38
Discharge: HOME | End: 2023-08-14
Attending: STUDENT IN AN ORGANIZED HEALTH CARE EDUCATION/TRAINING PROGRAM | Admitting: STUDENT IN AN ORGANIZED HEALTH CARE EDUCATION/TRAINING PROGRAM
Payer: COMMERCIAL

## 2023-08-14 VITALS
TEMPERATURE: 98 F | HEART RATE: 75 BPM | SYSTOLIC BLOOD PRESSURE: 120 MMHG | RESPIRATION RATE: 18 BRPM | DIASTOLIC BLOOD PRESSURE: 57 MMHG

## 2023-08-14 PROCEDURE — 90853 GROUP PSYCHOTHERAPY: CPT

## 2023-08-14 NOTE — PLAN OF CARE
"   08/14/23 1517   Activity/Group Therapy Checklist   Group Meditation/Relaxation   Attendance Attended   Follows Direction Followed directions   Group Interactions/Observations Interacted appropriately;Alert;Sharing;Supportive   Affect/Mood Range Normal range   Affect/Mood Display Appropriate   Goal Progression Progressing     Discussed mindfulness as a practice, the definition of mindfulness, and various research evidence to strengthen benefits of mindfulness. Engaged in "thought bubble" meditation where members were invited to witness body sensations, breath, and thoughts coming and going without attaching onto them.  "

## 2023-08-14 NOTE — PLAN OF CARE
08/14/23 1518   Activity/Group Therapy Checklist   Group Other (Comments)  (Processing Group)   Attendance Attended   Follows Direction Followed directions   Group Interactions/Observations Interacted appropriately;Alert;Sharing;Supportive   Affect/Mood Range Normal range   Affect/Mood Display Appropriate   Goal Progression Progressing

## 2023-08-15 ENCOUNTER — HOSPITAL ENCOUNTER (OUTPATIENT)
Dept: PSYCHIATRY | Facility: HOSPITAL | Age: 38
Discharge: HOME | End: 2023-08-15
Attending: STUDENT IN AN ORGANIZED HEALTH CARE EDUCATION/TRAINING PROGRAM | Admitting: STUDENT IN AN ORGANIZED HEALTH CARE EDUCATION/TRAINING PROGRAM
Payer: COMMERCIAL

## 2023-08-15 DIAGNOSIS — F60.3 BORDERLINE PERSONALITY DISORDER: Primary | ICD-10-CM

## 2023-08-15 DIAGNOSIS — F40.10 SOCIAL ANXIETY DISORDER: ICD-10-CM

## 2023-08-15 PROCEDURE — 90853 PR GROUP PSYCHOTHERAPY: ICD-10-PCS | Mod: ,,, | Performed by: PSYCHOLOGIST

## 2023-08-15 PROCEDURE — 99232 PR SUBSEQUENT HOSPITAL CARE,LEVL II: ICD-10-PCS | Mod: ,,, | Performed by: STUDENT IN AN ORGANIZED HEALTH CARE EDUCATION/TRAINING PROGRAM

## 2023-08-15 PROCEDURE — 99499 UNLISTED E&M SERVICE: CPT | Mod: ,,, | Performed by: PSYCHOLOGIST

## 2023-08-15 PROCEDURE — 90853 GROUP PSYCHOTHERAPY: CPT

## 2023-08-15 PROCEDURE — 99232 SBSQ HOSP IP/OBS MODERATE 35: CPT | Mod: ,,, | Performed by: STUDENT IN AN ORGANIZED HEALTH CARE EDUCATION/TRAINING PROGRAM

## 2023-08-15 PROCEDURE — 90853 GROUP PSYCHOTHERAPY: CPT | Mod: ,,, | Performed by: PSYCHOLOGIST

## 2023-08-15 PROCEDURE — 99499 NO LOS: ICD-10-PCS | Mod: ,,, | Performed by: PSYCHOLOGIST

## 2023-08-15 NOTE — PROGRESS NOTES
Group Psychotherapy (PhD/LCSW)    Site: [facility]    Clinical status of patient: Intensive Outpatient Program (IOP)    Date: 8/15/2023    Group Focus: Interpersonal Effectiveness    Length of service: 00636 - 45-50 minutes    Number of patients in attendance: 8    Referred by: [facility]    Target symptoms: Anxiety and BPD    Patient's response to treatment: Active Listening, Self Disclosure     Progress toward goals: Progressing adequately    Interval History: Session focus was Interpersonal Effectiveness:  GIVE and FAST.  Patients were introduced to skills to promote active listening, self-respect, and attendance to values. Patients were provided with opportunities to collaborate with others and role-play in session.    Diagnosis:       ICD-10-CM ICD-9-CM   1. Borderline personality disorder  F60.3 301.83   2. Social anxiety disorder  F40.10 300.23     Plan: Continue treatment on [facility]   normal...

## 2023-08-15 NOTE — PLAN OF CARE
08/15/23 8745   Activity/Group Therapy Checklist   Group Other (Comments)  (Cognitive Distortions)   Attendance Attended   Follows Direction Followed directions   Group Interactions/Observations Interacted appropriately;Alert;Sharing;Supportive   Affect/Mood Range Normal range   Affect/Mood Display Appropriate   Goal Progression Progressing

## 2023-08-15 NOTE — PROGRESS NOTES
[facility] PROGRESS NOTE    PATIENT NAME: Milvia Tuttle  MRN: [id number]  ENCOUNTER DATE:  8/15/2023 9:44 AM   SITE:  U unit, [facility]  ADMIT DATE: 8/3/2023  Pt Name: Milvia Tuttle   : 1985   MRN: [id number]      HISTORY OF PRESENTING ILLNESS:  Milvia Tuttle is a 38 y.o. female with history of BPD, depression, social anxiety disorder who is admitted to BMU for emotional dysregulation, anxiety, and depression.    Reviewed all notes in past 24h. Per documentation, patient has been well-engaged in groups and is progressing adequately.    Today, pt reports doing well. Weekend went well. Birthday was yesterday. Reported that on birthdays she feels sad; does not know why. Saturday went to [facility] in [place]. Did get anxious a few times there but was able to use coping skills to remain calm. Did not feel pressured to check on her phone for work. Reports she made an effort to have a good day, and it was a good day. Congratulated patient on emotional control.     Tomorrow is last day. Doesn't know how to feel about it. Feels like she is going to wake up Thursday and will lose direction. Reports worry about losing daily structure. Discussed methods to maintain structure in daily life once BMU is completed.     Reports needing to contact DBT [facility]. On waitlist for Dr. Soria's Tuesday afternoon course.     No SI reported.     Risk Parameters:  Patient reports no suicidal ideation  Patient reports no homicidal ideation  Patient reports no self-injurious behavior  Patient reports no violent behavior    Current psych meds  Lexapro 10mg QD  Trazodone 100mg QHS  Ativan 0.5mg PRN severe anxiety  Medication side effects:  None    Current Substance use  Alcohol : None  Nicotine:  None  Illicit's: None  Other Rx controlled substances (Ex-opiates, stimulants etc): None      PAST PSYCHIATRIC, MEDICAL, AND SOCIAL HISTORY REVIEWED  The patient's past medical, family and social history have been reviewed  and updated as appropriate within the electronic medical record     MEDICAL REVIEW OF SYSTEMS  History obtained from the patient  General : NO chills or fever  Respiratory: NO cough, shortness of breath  Cardiovascular: NO chest pain, palpitations or racing heart  Gastrointestinal: NO nausea, vomiting, constipation or diarrhea  Neurological: NO confusion, dizziness, headaches or tremors  Psychiatric: please see HPI     ALL MEDICATIONS:    Current Outpatient Medications:     EScitalopram oxalate (LEXAPRO) 10 MG tablet, Take 1 tablet (10 mg total) by mouth once daily., Disp: 30 tablet, Rfl: 0    EScitalopram oxalate (LEXAPRO) 5 MG Tab, Take 1 tablet (5 mg total) by mouth once daily., Disp: 30 tablet, Rfl: 11    fluticasone propionate (FLONASE) 50 mcg/actuation nasal spray, 1 spray (50 mcg total) by Each Nostril route once daily., Disp: 11.1 mL, Rfl: 1    levocetirizine (XYZAL) 5 MG tablet, Take 1 tablet (5 mg total) by mouth every evening. (Patient not taking: Reported on 7/18/2023), Disp: 30 tablet, Rfl: 3    LORazepam (ATIVAN) 0.5 MG tablet, Take 1 tablet (0.5 mg total) by mouth daily as needed for Anxiety (anxiety, dysregulation)., Disp: 15 tablet, Rfl: 0    rOPINIRole (REQUIP) 0.25 MG tablet, Take 1 tablet (0.25 mg total) by mouth 2 (two) times daily as needed., Disp: 60 tablet, Rfl: 11    traZODone (DESYREL) 50 MG tablet, Take 2 tablets (100 mg total) by mouth nightly as needed for Insomnia (Take 1-2 pills as needed nightly for sleep)., Disp: 60 tablet, Rfl: 11    valacyclovir (VALTREX) 1000 MG tablet, TK 2 TS PO Q 12 H PRN FOR 1 DAY FOR FEVER BLISTER, Disp: , Rfl: 1    ALLERGIES:  Review of patient's allergies indicates:  No Known Allergies    RELEVANT LABS/STUDIES:    Lab Results   Component Value Date    WBC 4.66 05/16/2023    HGB 13.4 05/16/2023    HCT 41.1 05/16/2023    MCV 95 05/16/2023     05/16/2023     BMP  Lab Results   Component Value Date     05/16/2023    K 4.4 05/16/2023      "05/16/2023    CO2 25 05/16/2023    BUN 12 05/16/2023    CREATININE 0.8 05/16/2023    CALCIUM 9.4 05/16/2023    ANIONGAP 10 05/16/2023    ESTGFRAFRICA >60.0 02/16/2017    EGFRNONAA >60.0 02/16/2017     Lab Results   Component Value Date    ALT 11 05/16/2023    AST 12 05/16/2023    ALKPHOS 42 (L) 05/16/2023    BILITOT 0.5 05/16/2023     Lab Results   Component Value Date    TSH 1.395 02/16/2017     Lab Results   Component Value Date    HGBA1C 5.1 05/16/2023       VITALS  defer to nursing note    PHYSICAL EXAM  General: well developed, well nourished  Neurologic:   Gait: Normal   Psychomotor signs:  No involuntary movements or tremor  AIMS: none    PSYCHIATRIC EXAM:     Mental Status Exam:  Arousal: alert  Sensorium/Orientation: grossly intact  Behavior/Cooperation: normal, cooperative   Speech: normal tone, normal rate, normal pitch, normal volume  Language: grossly intact  Mood: "good"   Affect: appropriate and smiling  Thought Process: normal and logical  Thought Content:   Auditory hallucinations: NO  Visual hallucinations: NO  Paranoia: NO  Delusions:  NO  Suicidal ideation: NO  Homicidal ideation: NO  Attention/Concentration:  intact  Memory:    Recent:  Intact   Remote: Intact  Fund of Knowledge: Intact and Vocabulary appropriate    Abstract reasoning: not assessed  Insight: intact, has awareness of illness  Judgment: behavior is adequate to circumstances       IMPRESSION:    Milvia Tuttle is a 38 y.o. female with history of BPD, anxiety, depression who is admitted to BMU for emotional dysregulation, anxiety.    Status/Progress:  Based on the examination today, the patient's problem(s) is/are inadequately controlled.  New problems have not been presented today.     DIAGNOSES:  BPD  Social Anxiety Disorder  Unspecified insomnia                                                                                                                                                                                             "                                                                                                                                                                                                                         PLAN:    1) Continue BMU program with group and individual therapies.     2) Psych Med:  Continue escitalopram 10mg daily  Continue lorazepam PRN and trazodone as prescribed    Discussed with patient informed consent, risks vs. benefits, alternative treatments, side effect profile and the inherent unpredictability of individual responses to these treatments. Answered any questions patient may have had. The patient expresses understanding of the above and displays the capacity to agree with this current plan     3) Progressing well in program. Frustration tolerance improving. Becoming more mindful in day to day life. Tolerating medication well without adverse effects.        Josh Carbone MD  [employer] [facility] PGY4  8/15/2023 9:44 AM

## 2023-08-16 ENCOUNTER — HOSPITAL ENCOUNTER (OUTPATIENT)
Dept: PSYCHIATRY | Facility: HOSPITAL | Age: 38
Discharge: HOME | End: 2023-08-16
Attending: STUDENT IN AN ORGANIZED HEALTH CARE EDUCATION/TRAINING PROGRAM | Admitting: STUDENT IN AN ORGANIZED HEALTH CARE EDUCATION/TRAINING PROGRAM
Payer: COMMERCIAL

## 2023-08-16 VITALS
RESPIRATION RATE: 18 BRPM | HEART RATE: 92 BPM | DIASTOLIC BLOOD PRESSURE: 71 MMHG | SYSTOLIC BLOOD PRESSURE: 121 MMHG | TEMPERATURE: 98 F

## 2023-08-16 DIAGNOSIS — F60.3 BORDERLINE PERSONALITY DISORDER: Primary | ICD-10-CM

## 2023-08-16 DIAGNOSIS — F40.10 SOCIAL ANXIETY DISORDER: ICD-10-CM

## 2023-08-16 PROCEDURE — 90853 PR GROUP PSYCHOTHERAPY: ICD-10-PCS | Mod: ,,, | Performed by: PSYCHOLOGIST

## 2023-08-16 PROCEDURE — 99233 SBSQ HOSP IP/OBS HIGH 50: CPT | Mod: ,,, | Performed by: STUDENT IN AN ORGANIZED HEALTH CARE EDUCATION/TRAINING PROGRAM

## 2023-08-16 PROCEDURE — 99233 PR SUBSEQUENT HOSPITAL CARE,LEVL III: ICD-10-PCS | Mod: ,,, | Performed by: STUDENT IN AN ORGANIZED HEALTH CARE EDUCATION/TRAINING PROGRAM

## 2023-08-16 PROCEDURE — 90853 GROUP PSYCHOTHERAPY: CPT | Mod: ,,, | Performed by: PSYCHOLOGIST

## 2023-08-16 PROCEDURE — 90853 GROUP PSYCHOTHERAPY: CPT

## 2023-08-16 RX ORDER — ESCITALOPRAM OXALATE 10 MG/1
10 TABLET ORAL DAILY
Qty: 30 TABLET | Refills: 1 | Status: SHIPPED | OUTPATIENT
Start: 2023-08-16 | End: 2023-10-15

## 2023-08-16 NOTE — PLAN OF CARE
08/16/23 1452   Activity/Group Therapy Checklist   Group Activity   Attendance Attended   Follows Direction Followed directions   Group Interactions/Observations Interacted appropriately;Alert   Affect/Mood Range Normal range   Affect/Mood Display Appropriate   Goal Progression Progressing

## 2023-08-16 NOTE — PROGRESS NOTES
Group Psychotherapy (PhD/LCSW)     Site: [facility]     Clinical status of patient: Intensive Outpatient Program (IOP)     Date: 08/15/2023     Group Focus: Psychodynamic Process Group     Length of service: 45353 -60 minutes     Number of patients in attendance: 5     Referred by: [facility]     Target symptoms:Mood Disorder and  Anxiety     Patient's response to treatment: Active Listening and Self-Disclosure     Progress toward goals: Progressing adequately     Interval History: Patient expressed that she was feeling anxious about completing the program tomorrow. Therapist discussed options for patient to start individual therapy with a therapist in the community or with a psychology intern. She was receptive and remained engaged.    Diagnosis:     ICD-10-CM ICD-9-CM   1. Borderline personality disorder  F60.3 301.83   2. Social anxiety disorder  F40.10 300.23       Plan: Continue  [facility] program

## 2023-08-16 NOTE — DISCHARGE SUMMARY
[facility] DISCHARGE SUMMARY    Discharge Date: 2023 12:20 PM   Pt Name: Milvia Tuttle   : 1985   MRN: [id number]     Admit Date: 2023      SUBJECTIVE:  Patient is a 38 y.o. old, female, with past psychiatric history of BPD, MDD, anxiety admitted to U for depression.    Program course  On initial presentation, pt's mood was irritable. She endorsed the following: unstable and intense relationships that alternate between extremes of idealization and devaluation, unstable image of self, impulsivity that can be self damaging (binge eating and excessive exercise), affective instability, chronic feelings of emptiness, difficulty controlling anger, isolating herself from others, insomnia, racing thoughts, depressed mood. Diagnosed with BPD.    Pt progressed well during the program with good participation during group therapy. She attended regularly and was punctual to groups. She exhibited no behavioral disturbances. She made improvements in using coping skills in controlling her moments of irritability and ruminations. She reported benefit from a daily structure and being around others as opposed to being alone at home.     Her Lexapro was increased from 5 mg to 10 mg to better control depression and anxiety. She tolerated this well with no reports of adverse effects. No other medications prescribed by outpatient psychiatry were adjusted.    At discharge, patient reported a good mood. Speech was normal rate and tone. Thought process was organized and logical. Denied suicidal ideation, HI, hallucinations. No objective evidence of heidy or psychosis. Suitable for transition of care back to the outpatient setting.    Risk Parameters:  Patient reports no suicidal ideation  Patient reports no homicidal ideation  Patient reports no self-injurious behavior  Patient reports no violent behavior    Allergies:   Review of patient's allergies indicates:  No Known Allergies      Current  "Medications:   Lexapro 10mg QD  Trazodone 100mg QHS  Ativan 0.5mg PRN severe anxiety        OBJECTIVE:   Vitals (Most Recent)  Vitals:    08/16/23 0900   BP: 121/71   Pulse: 92   Resp: 18   Temp: 98 °F (36.7 °C)         Labs/Imaging/Studies:   No results found for this or any previous visit (from the past 48 hour(s)).   No results found for: "PHENYTOIN", "PHENOBARB", "VALPROATE", "CBMZ"    Mental Status Exam:  General Appearance: appears stated age, well developed and nourished, adequately groomed and appropriately dressed, in no acute distress  Behavior: normal; cooperative; reasonably friendly, pleasant, and polite; appropriate eye-contact; under good behavioral control  Involuntary Movements and Motor Activity: no abnormal involuntary movements noted; no tics, no tremors, no akathisia, no dystonia, no evidence of tardive dyskinesia; no psychomotor agitation or retardation  Gait and Station: intact, normal gait and station, ambulates without assistance  Speech and Language: intact; normal rate, rhythm, volume, tone, and pitch; conversational, spontaneous, and coherent; speaks and understands English proficiently and fluently; repeats words and phrases, no word finding difficulties are noted  Mood: "fine"  Affect: normal, euthymic, reactive, full-range, mood-congruent, appropriate to situation and context  Thought Process and Associations: intact; linear, goal-directed, organized, and logical; no loosening of associations noted  Thought Content and Perceptions:: no suicidal or homicidal ideation, no auditory or visual hallucinations, no paranoid ideation, no ideas of reference, no evidence of delusions or psychosis  Sensorium and Orientation: intact; alert with clear sensorium; oriented fully to person, place, time and situation  Recent and Remote Memory: grossly intact, able to recall relevant and salient information from the recent and remote past  Attention and Concentration: grossly intact, attentive to the " conversation and not readily distractible  Fund of Knowledge: grossly intact, used appropriate vocabulary and demonstrated an awareness of current events, consistent with educational level achieved  Insight: intact, demonstrates awareness of illness and situation  Judgment: intact, behavior is adequate/appropriate to the circumstances, compliant with health provider's recommendations and instructions      AIMS: n/a    ASSESSMENT/PLAN:   General Assessment:  Patient is a 38 y.o., female, admitted to the BMU partial hospitalization program  for stabilization of emotional dysregulation, anxiety.      Diagnosis:  BPD  Unspecified depressive d/o  Social Anxiety Disorder  Unspecified Insomnia     Plan:  1. Completed BMU treatment with moderate improvement in presenting symptoms and was encouraged to attend after care groups for better transition to out pt care.     2. Psych meds  Continue current med regimen, tolerating well with out any side effects. Pt provided with enough refills until follow up appointment.  Lexapro 10mg QD, Trazodone 100mg QHS, Ativan 0.5mg PRN severe anxiety  Discussed with patient informed consent, risks vs. benefits, alternative treatments, side effect profile and the inherent unpredictability of individual responses to these treatments. Answered any questions patient may have had. The patient expresses understanding of the above and displays the capacity to agree with this.     3. Patient Discharge Instructions and informed to:-  Follow up regularly with Outpatient [facility] appointments for further psychiatric care and management. Please see SW note for after care appointments  Refrain from using excessive alcohol, avoid any illicit substances and or abuse of prescription medications, as this may worsen mood and may be detrimental to health.  Call the crisis line at: [phone] for help in a crisis and emergent situations and present to ED for any worsening of psychiatric symptoms or any  SI/HI/AVH    Nani Cardenas, MS3    The above note was initially completed by MS3 and edited by myself for accuracy. I personally evaluated the patient at discharge.    Josh Carbone MD  [facility] PGY4

## 2023-08-16 NOTE — PROGRESS NOTES
Group Psychotherapy (PhD/LCSW)    Site: [facility]    Clinical status of patient: Intensive Outpatient Program (IOP)    Date: 8/16/2023    Group Focus: Distress Tolerance     Length of service: 26558 - 45-50 minutes    Number of patients in attendance: 11    Referred by: [facility]    Target symptoms: Anxiety and BPD    Patient's response to treatment: Active Listening, Self Disclosure     Progress toward goals: Progressing adequately    Interval History: Session focus was Distress Tolerance: IMPROVE. Patients were encouraged to use imagery, find meaning, use prayer, relax, focus on one thing in the moment, take a brief vacation, and use self-encouragement.    Diagnosis:     ICD-10-CM ICD-9-CM   1. Borderline personality disorder  F60.3 301.83   2. Social anxiety disorder  F40.10 300.23       Plan: Continue treatment on [facility]   See orders/plan

## 2023-08-17 NOTE — PROGRESS NOTES
Group Psychotherapy (PhD/LCSW)     Site: [facility]     Clinical status of patient: Intensive Outpatient Program (IOP)     Date: 08/16/2023     Group Focus: Psychodynamic Process Group     Length of service: 19748 -60 minutes     Number of patients in attendance: 9     Referred by: [facility]     Target symptoms:Mood Disorder and  Anxiety     Patient's response to treatment: Active Listening and Self-Disclosure     Progress toward goals: Progressing adequately     Interval History: Patient expressed that she was still feeling anxious about completing the program today. She expressed skills learned and benefit from the program. She received support and encouragement form other group members. She also presented gifts with positive affirmations for all group members.    Diagnosis:     ICD-10-CM ICD-9-CM   1. Borderline personality disorder  F60.3 301.83   2. Social anxiety disorder  F40.10 300.23       Plan: Continue  W program

## 2023-08-30 ENCOUNTER — PATIENT MESSAGE (OUTPATIENT)
Dept: PSYCHIATRY | Facility: CLINIC | Age: 38
End: 2023-08-30
Payer: COMMERCIAL

## 2023-09-01 ENCOUNTER — PATIENT MESSAGE (OUTPATIENT)
Dept: PSYCHIATRY | Facility: CLINIC | Age: 38
End: 2023-09-01
Payer: COMMERCIAL

## 2023-09-01 RX ORDER — LORAZEPAM 0.5 MG/1
0.5 TABLET ORAL DAILY PRN
Qty: 15 TABLET | Refills: 0 | Status: SHIPPED | OUTPATIENT
Start: 2023-09-01 | End: 2023-10-02 | Stop reason: SDUPTHER

## 2023-09-08 ENCOUNTER — PATIENT MESSAGE (OUTPATIENT)
Dept: NEUROLOGY | Facility: CLINIC | Age: 38
End: 2023-09-08
Payer: COMMERCIAL

## 2023-09-08 ENCOUNTER — CLINICAL SUPPORT (OUTPATIENT)
Dept: PSYCHIATRY | Facility: CLINIC | Age: 38
End: 2023-09-08
Payer: COMMERCIAL

## 2023-09-08 DIAGNOSIS — F60.3 BORDERLINE PERSONALITY DISORDER: Primary | ICD-10-CM

## 2023-09-08 PROCEDURE — 90853 PR GROUP PSYCHOTHERAPY: ICD-10-PCS | Mod: S$GLB,,,

## 2023-09-08 PROCEDURE — 90853 GROUP PSYCHOTHERAPY: CPT | Mod: S$GLB,,,

## 2023-09-08 NOTE — PROGRESS NOTES
Group Psychotherapy (PhD/LCSW)    Site: [facility]    Clinical status of patient: Intensive Outpatient Program (IOP)    Date: 9/8/2023    Group Focus: Psychodynamic Process Group    Length of service: 70596 - 45-50 minutes    Number of patients in attendance: 3    Referred by: [facility]     Target symptoms: Anxiety and BPD    Patient's response to treatment: Active Listening    Progress toward goals: Progressing adequately    Interval History: Group focus was establishing group norms. Clinician set an agenda and asked members if there were other items they would like to contribute. Participates reflected on their success and challenges since graduating from the [employer] [facility] [place]. Group members also identified personal goals for their group participation.       Diagnosis:     ICD-10-CM ICD-9-CM   1. Borderline personality disorder  F60.3 301.83        Plan: Continue treatment in aftercare group

## 2023-09-11 ENCOUNTER — PATIENT MESSAGE (OUTPATIENT)
Dept: PSYCHIATRY | Facility: CLINIC | Age: 38
End: 2023-09-11
Payer: COMMERCIAL

## 2023-09-22 ENCOUNTER — CLINICAL SUPPORT (OUTPATIENT)
Dept: PSYCHIATRY | Facility: CLINIC | Age: 38
End: 2023-09-22
Payer: COMMERCIAL

## 2023-09-22 DIAGNOSIS — F60.3 BORDERLINE PERSONALITY DISORDER: Primary | ICD-10-CM

## 2023-09-22 PROCEDURE — 99499 NO LOS: ICD-10-PCS | Mod: S$GLB,,,

## 2023-09-22 PROCEDURE — 99499 UNLISTED E&M SERVICE: CPT | Mod: S$GLB,,,

## 2023-09-22 NOTE — PROGRESS NOTES
Group Psychotherapy (PhD/LCSW)    Site: [facility]    Clinical status of patient: Intensive Outpatient Program (IOP)    Date: 9/22/2023    Group Focus: Psychodynamic Process Group    Length of service: 37092 - 45-50 minutes    Number of patients in attendance: 3    Referred by: [facility]     Target symptoms: Anxiety and BPD    Patient's response to treatment: Active Listening    Progress toward goals: Progressing adequately    Interval History: Group focus was on validating feelings, rather than dismissing. Participants designed behavioral experiments to notice 'should' statement made in regards to how they are feeling, and finding one opportunity throughout the week to offer themselves labeled praise. Patient shared how she finds herself getting easily frustrated, and then frustrated that she is frustrated.       Diagnosis:     ICD-10-CM ICD-9-CM   1. Borderline personality disorder  F60.3 301.83        Plan: Continue treatment in aftercare group

## 2023-09-25 ENCOUNTER — PATIENT MESSAGE (OUTPATIENT)
Dept: PSYCHIATRY | Facility: CLINIC | Age: 38
End: 2023-09-25
Payer: COMMERCIAL

## 2023-09-27 ENCOUNTER — CLINICAL SUPPORT (OUTPATIENT)
Dept: PSYCHIATRY | Facility: CLINIC | Age: 38
End: 2023-09-27
Payer: COMMERCIAL

## 2023-09-27 DIAGNOSIS — F60.3 BORDERLINE PERSONALITY DISORDER: Primary | ICD-10-CM

## 2023-09-27 DIAGNOSIS — F40.10 SOCIAL ANXIETY DISORDER: ICD-10-CM

## 2023-09-27 PROCEDURE — 99499 NO LOS: ICD-10-PCS | Mod: S$GLB,,, | Performed by: PSYCHOLOGIST

## 2023-09-27 PROCEDURE — 99499 UNLISTED E&M SERVICE: CPT | Mod: S$GLB,,, | Performed by: PSYCHOLOGIST

## 2023-10-02 ENCOUNTER — OFFICE VISIT (OUTPATIENT)
Dept: PSYCHIATRY | Facility: CLINIC | Age: 38
End: 2023-10-02
Payer: COMMERCIAL

## 2023-10-02 ENCOUNTER — PATIENT MESSAGE (OUTPATIENT)
Dept: PSYCHIATRY | Facility: CLINIC | Age: 38
End: 2023-10-02
Payer: COMMERCIAL

## 2023-10-02 VITALS
HEART RATE: 71 BPM | BODY MASS INDEX: 27.89 KG/M2 | WEIGHT: 183.44 LBS | SYSTOLIC BLOOD PRESSURE: 119 MMHG | DIASTOLIC BLOOD PRESSURE: 69 MMHG

## 2023-10-02 DIAGNOSIS — F60.3 BORDERLINE PERSONALITY DISORDER: Primary | ICD-10-CM

## 2023-10-02 DIAGNOSIS — G47.00 INSOMNIA, UNSPECIFIED TYPE: ICD-10-CM

## 2023-10-02 DIAGNOSIS — F40.10 SOCIAL ANXIETY DISORDER: ICD-10-CM

## 2023-10-02 PROCEDURE — 3044F HG A1C LEVEL LT 7.0%: CPT | Mod: CPTII,S$GLB,, | Performed by: STUDENT IN AN ORGANIZED HEALTH CARE EDUCATION/TRAINING PROGRAM

## 2023-10-02 PROCEDURE — 3074F PR MOST RECENT SYSTOLIC BLOOD PRESSURE < 130 MM HG: ICD-10-PCS | Mod: CPTII,S$GLB,, | Performed by: STUDENT IN AN ORGANIZED HEALTH CARE EDUCATION/TRAINING PROGRAM

## 2023-10-02 PROCEDURE — 3078F DIAST BP <80 MM HG: CPT | Mod: CPTII,S$GLB,, | Performed by: STUDENT IN AN ORGANIZED HEALTH CARE EDUCATION/TRAINING PROGRAM

## 2023-10-02 PROCEDURE — 99999 PR PBB SHADOW E&M-EST. PATIENT-LVL II: ICD-10-PCS | Mod: PBBFAC,,, | Performed by: STUDENT IN AN ORGANIZED HEALTH CARE EDUCATION/TRAINING PROGRAM

## 2023-10-02 PROCEDURE — 3008F BODY MASS INDEX DOCD: CPT | Mod: CPTII,S$GLB,, | Performed by: STUDENT IN AN ORGANIZED HEALTH CARE EDUCATION/TRAINING PROGRAM

## 2023-10-02 PROCEDURE — 99214 OFFICE O/P EST MOD 30 MIN: CPT | Mod: S$GLB,,, | Performed by: STUDENT IN AN ORGANIZED HEALTH CARE EDUCATION/TRAINING PROGRAM

## 2023-10-02 PROCEDURE — 3078F PR MOST RECENT DIASTOLIC BLOOD PRESSURE < 80 MM HG: ICD-10-PCS | Mod: CPTII,S$GLB,, | Performed by: STUDENT IN AN ORGANIZED HEALTH CARE EDUCATION/TRAINING PROGRAM

## 2023-10-02 PROCEDURE — 3074F SYST BP LT 130 MM HG: CPT | Mod: CPTII,S$GLB,, | Performed by: STUDENT IN AN ORGANIZED HEALTH CARE EDUCATION/TRAINING PROGRAM

## 2023-10-02 PROCEDURE — 3044F PR MOST RECENT HEMOGLOBIN A1C LEVEL <7.0%: ICD-10-PCS | Mod: CPTII,S$GLB,, | Performed by: STUDENT IN AN ORGANIZED HEALTH CARE EDUCATION/TRAINING PROGRAM

## 2023-10-02 PROCEDURE — 99999 PR PBB SHADOW E&M-EST. PATIENT-LVL II: CPT | Mod: PBBFAC,,, | Performed by: STUDENT IN AN ORGANIZED HEALTH CARE EDUCATION/TRAINING PROGRAM

## 2023-10-02 PROCEDURE — 99214 PR OFFICE/OUTPT VISIT, EST, LEVL IV, 30-39 MIN: ICD-10-PCS | Mod: S$GLB,,, | Performed by: STUDENT IN AN ORGANIZED HEALTH CARE EDUCATION/TRAINING PROGRAM

## 2023-10-02 PROCEDURE — 3008F PR BODY MASS INDEX (BMI) DOCUMENTED: ICD-10-PCS | Mod: CPTII,S$GLB,, | Performed by: STUDENT IN AN ORGANIZED HEALTH CARE EDUCATION/TRAINING PROGRAM

## 2023-10-02 RX ORDER — LORAZEPAM 0.5 MG/1
0.5 TABLET ORAL DAILY PRN
Qty: 15 TABLET | Refills: 0 | Status: SHIPPED | OUTPATIENT
Start: 2023-10-02 | End: 2023-10-30 | Stop reason: SDUPTHER

## 2023-10-02 RX ORDER — ESCITALOPRAM OXALATE 20 MG/1
20 TABLET ORAL DAILY
Qty: 30 TABLET | Refills: 11 | Status: SHIPPED | OUTPATIENT
Start: 2023-10-02 | End: 2023-10-30 | Stop reason: SDUPTHER
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
## 2023-10-04 ENCOUNTER — PATIENT MESSAGE (OUTPATIENT)
Dept: PSYCHIATRY | Facility: CLINIC | Age: 38
End: 2023-10-04
Payer: COMMERCIAL

## 2023-10-11 ENCOUNTER — CLINICAL SUPPORT (OUTPATIENT)
Dept: PSYCHIATRY | Facility: CLINIC | Age: 38
End: 2023-10-11
Payer: COMMERCIAL

## 2023-10-11 DIAGNOSIS — F60.3 BORDERLINE PERSONALITY DISORDER: Primary | ICD-10-CM

## 2023-10-11 PROCEDURE — 99499 UNLISTED E&M SERVICE: CPT | Mod: S$GLB,,, | Performed by: PSYCHOLOGIST

## 2023-10-11 PROCEDURE — 99499 NO LOS: ICD-10-PCS | Mod: S$GLB,,, | Performed by: PSYCHOLOGIST

## 2023-10-11 NOTE — PROGRESS NOTES
Individual Psychotherapy (PhD/LCSW)    10/11/2023    Site:  [facility]         Therapeutic Intervention: Met with patient.  Outpatient - Insight oriented psychotherapy 60 min - CPT code 36530 and Outpatient - Supportive psychotherapy 60 min - CPT Code 66173    Chief complaint/reason for encounter: mood swings, mood elevation, anxiety, and behavior     Interval history and content of current session:   Mrs. Tuttle arrived on time for her appointment. She appeared anxious and her reported mood was congruent. She provided an update of her week since the last session. She focused on challenges related to increased work responsibilities. Therapist guided patient through behavior chain analysis to increase insight into the factors contributing to current behaviors. Therapist collaborated with patient to create a safety plan to reduce risk of future self harm. Patient also was provided with additional community crisis resources.     Practice Assignments  -Follow safety plan   -Decrease avoidance behaviors (initiate difficult conversation w/)       Treatment plan:  Target symptoms: anxiety , mood swings, work stress  Why chosen therapy is appropriate versus another modality: relevant to diagnosis  Outcome monitoring methods: self-report  Therapeutic intervention type: insight oriented psychotherapy, behavior modifying psychotherapy    Risk parameters:  Patient reports no suicidal ideation  Patient reports no homicidal ideation  Patient reports no self-injurious behavior  Patient reports no violent behavior    Verbal deficits: None    Patient's response to intervention:  The patient's response to intervention is accepting.    Progress toward goals and other mental status changes:  The patient's progress toward goals is fair .    Diagnosis:     ICD-10-CM ICD-9-CM   1. Borderline personality disorder  F60.3 301.83        Plan:  individual psychotherapy    Return to clinic: 2 weeks    Length of Service  (minutes): 60      Jero Baker,  Adult Psychology Doctoral Intern  [facility]

## 2023-10-12 ENCOUNTER — PATIENT MESSAGE (OUTPATIENT)
Dept: PSYCHIATRY | Facility: CLINIC | Age: 38
End: 2023-10-12
Payer: COMMERCIAL

## 2023-10-13 ENCOUNTER — CLINICAL SUPPORT (OUTPATIENT)
Dept: PSYCHIATRY | Facility: CLINIC | Age: 38
End: 2023-10-13
Payer: COMMERCIAL

## 2023-10-13 DIAGNOSIS — F60.3 BORDERLINE PERSONALITY DISORDER: Primary | ICD-10-CM

## 2023-10-13 PROCEDURE — 99499 UNLISTED E&M SERVICE: CPT | Mod: S$GLB,,,

## 2023-10-13 PROCEDURE — 99499 NO LOS: ICD-10-PCS | Mod: S$GLB,,,

## 2023-10-13 NOTE — PROGRESS NOTES
Group Psychotherapy (PhD/LCSW)    Site: [facility]    Clinical status of patient: Intensive Outpatient Program (IOP)    Date: 10/13/2023    Group Focus: Psychodynamic Process Group    Length of service: 26216 - 45-50 minutes    Number of patients in attendance: 3    Referred by: [facility]     Target symptoms: Anxiety and BPD    Patient's response to treatment: Active Listening    Progress toward goals: Progressing adequately    Interval History: Group focus was on finding ways to trying to find mindful ways to appreciate the passing of time, especially with the holiday season approaching. Group members discussed the challenges of being mindful and giving themselves credit for those moments when they are able to foster present moment awareness. Group members also counseled one another on self-compassion after making financial mistakes. Patient reflected on her search for existential meaning and purpose, while noticing the 'should' statements and rules she uses to create meaning in her own life.       Diagnosis:     ICD-10-CM ICD-9-CM   1. Borderline personality disorder  F60.3 301.83          Plan: Continue treatment in aftercare group

## 2023-10-17 ENCOUNTER — PATIENT MESSAGE (OUTPATIENT)
Dept: PSYCHIATRY | Facility: CLINIC | Age: 38
End: 2023-10-17
Payer: COMMERCIAL

## 2023-10-25 ENCOUNTER — CLINICAL SUPPORT (OUTPATIENT)
Dept: PSYCHIATRY | Facility: CLINIC | Age: 38
End: 2023-10-25
Payer: COMMERCIAL

## 2023-10-25 DIAGNOSIS — F60.3 BORDERLINE PERSONALITY DISORDER: Primary | ICD-10-CM

## 2023-10-25 PROCEDURE — 99499 NO LOS: ICD-10-PCS | Mod: S$GLB,,, | Performed by: PSYCHOLOGIST

## 2023-10-25 PROCEDURE — 99499 UNLISTED E&M SERVICE: CPT | Mod: S$GLB,,, | Performed by: PSYCHOLOGIST

## 2023-10-25 NOTE — PROGRESS NOTES
"Individual Psychotherapy (PhD/LCSW)    10/25/2023    Site:  [facility]         Therapeutic Intervention: Met with patient.  Outpatient - Insight oriented psychotherapy 60 min - CPT code 05957 and Outpatient - Supportive psychotherapy 60 min - CPT Code 14215    Chief complaint/reason for encounter: mood swings, mood elevation, anxiety, and behavior     Interval history and content of current session:   Mrs. Tuttle arrived on time for her appointment. She appeared euthymic and her reported mood was congruent. She provided an update of her week since the last session. Patient focused on her difficulty with "letting go of control." She reported challenges with avoiding difficult conversations. Therapist highlighted clients strengths (taking actionable steps, increase self care, decreased emotional activation). Therapist provided discussed behavorial activation, and assisted the patient with identifying alternative behaviors. Patient participated and remained engaged throughout the session.        Practice Assignments  - Complete alternative behavior worksheet  -Decrease avoidance behaviors (initiate difficult conversation w/)   - refer to safety plan as needed        Treatment plan:  Target symptoms: anxiety , mood swings, work stress  Why chosen therapy is appropriate versus another modality: relevant to diagnosis  Outcome monitoring methods: self-report  Therapeutic intervention type: insight oriented psychotherapy, behavior modifying psychotherapy    Risk parameters:  Patient reports no suicidal ideation  Patient reports no homicidal ideation  Patient reports no self-injurious behavior  Patient reports no violent behavior    Verbal deficits: None    Patient's response to intervention:  The patient's response to intervention is accepting.    Progress toward goals and other mental status changes:  The patient's progress toward goals is fair .    Diagnosis:     ICD-10-CM ICD-9-CM   1. Borderline " personality disorder  F60.3 301.83        Plan:  individual psychotherapy    Return to clinic: 2 weeks    Length of Service (minutes): 60      Jero Baker,  Adult Psychology Doctoral Intern  [facility]

## 2023-10-30 ENCOUNTER — OFFICE VISIT (OUTPATIENT)
Dept: PSYCHIATRY | Facility: CLINIC | Age: 38
End: 2023-10-30
Payer: COMMERCIAL

## 2023-10-30 DIAGNOSIS — F60.3 BORDERLINE PERSONALITY DISORDER: Primary | ICD-10-CM

## 2023-10-30 DIAGNOSIS — F43.10 PTSD (POST-TRAUMATIC STRESS DISORDER): ICD-10-CM

## 2023-10-30 DIAGNOSIS — G47.00 INSOMNIA, UNSPECIFIED TYPE: ICD-10-CM

## 2023-10-30 DIAGNOSIS — F40.10 SOCIAL ANXIETY DISORDER: ICD-10-CM

## 2023-10-30 PROCEDURE — 99214 OFFICE O/P EST MOD 30 MIN: CPT | Mod: 95,,, | Performed by: STUDENT IN AN ORGANIZED HEALTH CARE EDUCATION/TRAINING PROGRAM

## 2023-10-30 PROCEDURE — 3044F HG A1C LEVEL LT 7.0%: CPT | Mod: CPTII,95,, | Performed by: STUDENT IN AN ORGANIZED HEALTH CARE EDUCATION/TRAINING PROGRAM

## 2023-10-30 PROCEDURE — 99214 PR OFFICE/OUTPT VISIT, EST, LEVL IV, 30-39 MIN: ICD-10-PCS | Mod: 95,,, | Performed by: STUDENT IN AN ORGANIZED HEALTH CARE EDUCATION/TRAINING PROGRAM

## 2023-10-30 PROCEDURE — 3044F PR MOST RECENT HEMOGLOBIN A1C LEVEL <7.0%: ICD-10-PCS | Mod: CPTII,95,, | Performed by: STUDENT IN AN ORGANIZED HEALTH CARE EDUCATION/TRAINING PROGRAM

## 2023-10-30 RX ORDER — ESCITALOPRAM OXALATE 20 MG/1
20 TABLET ORAL DAILY
Qty: 30 TABLET | Refills: 11 | Status: SHIPPED | OUTPATIENT
Start: 2023-10-30 | End: 2024-10-29

## 2023-10-30 RX ORDER — LORAZEPAM 0.5 MG/1
0.5 TABLET ORAL DAILY PRN
Qty: 15 TABLET | Refills: 0 | Status: SHIPPED | OUTPATIENT
Start: 2023-10-30 | End: 2024-01-08 | Stop reason: SDUPTHER

## 2023-10-30 RX ORDER — TRAZODONE HYDROCHLORIDE 50 MG/1
100 TABLET ORAL NIGHTLY PRN
Qty: 60 TABLET | Refills: 11 | Status: SHIPPED | OUTPATIENT
Start: 2023-10-30 | End: 2024-10-29
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
## 2023-11-03 ENCOUNTER — CLINICAL SUPPORT (OUTPATIENT)
Dept: PSYCHIATRY | Facility: CLINIC | Age: 38
End: 2023-11-03
Payer: COMMERCIAL

## 2023-11-03 ENCOUNTER — PATIENT MESSAGE (OUTPATIENT)
Dept: PSYCHIATRY | Facility: CLINIC | Age: 38
End: 2023-11-03
Payer: COMMERCIAL

## 2023-11-03 DIAGNOSIS — F60.3 BORDERLINE PERSONALITY DISORDER: Primary | ICD-10-CM

## 2023-11-03 PROCEDURE — 99499 UNLISTED E&M SERVICE: CPT | Mod: S$GLB,,,

## 2023-11-03 PROCEDURE — 99499 NO LOS: ICD-10-PCS | Mod: S$GLB,,,

## 2023-11-03 NOTE — PROGRESS NOTES
Group Psychotherapy (PhD/LCSW)    Site: [facility]    Clinical status of patient: Intensive Outpatient Program (IOP)    Date: 11/3/2023    Group Focus: Psychodynamic Process Group    Length of service: 50145 - 45-50 minutes    Number of patients in attendance: 4    Referred by: [facility]     Target symptoms: Anxiety and BPD    Patient's response to treatment: Active Listening    Progress toward goals: Progressing adequately    Interval History: Group focus was on managing chronic pain and health conditions, while finding opportunities to schedule time for other enjoyable and non-health related activities. Group discussed the idea of calmness or relaxation as a value, and investigated whether it was a priority in their lives.        Diagnosis:     ICD-10-CM ICD-9-CM   1. Borderline personality disorder  F60.3 301.83       Plan: Continue treatment in aftercare group

## 2023-11-08 ENCOUNTER — CLINICAL SUPPORT (OUTPATIENT)
Dept: PSYCHIATRY | Facility: CLINIC | Age: 38
End: 2023-11-08
Payer: COMMERCIAL

## 2023-11-08 DIAGNOSIS — F60.3 BORDERLINE PERSONALITY DISORDER: Primary | ICD-10-CM

## 2023-11-08 PROCEDURE — 99499 UNLISTED E&M SERVICE: CPT | Mod: S$GLB,,, | Performed by: PSYCHOLOGIST

## 2023-11-08 PROCEDURE — 99499 NO LOS: ICD-10-PCS | Mod: S$GLB,,, | Performed by: PSYCHOLOGIST

## 2023-11-08 NOTE — PROGRESS NOTES
"Individual Psychotherapy (PhD/LCSW)    11/8/2023    Site:  [facility]         Therapeutic Intervention: Met with patient.  Outpatient - Insight oriented psychotherapy 60 min - CPT code 66088 and Outpatient - Supportive psychotherapy 60 min - CPT Code 99117    Chief complaint/reason for encounter: mood swings, mood elevation, anxiety, and behavior     Interval history and content of current session:   Mrs. Tuttle arrived on time for her appointment. She appeared euthymic and her reported mood was congruent. She provided an update of her week since the last session. Patient reported practicing mindfulness skills, and feeling "less stressed at work." She focused on unhelpful thoughts related to upcoming in-law visit. Therapist highlighted the patients strengths, and progress with decreasing impulsive reactions. Therapist discussed negative cognitions and reviewed thinking traps with the patient to assist with reducing the frequency of maladaptive behaviors, thoughts, and feelings. The patient remained engaged throughout the session.     Practice Assignments  - Continue mindfulness   - Challenge unhelpful thinking, and engage in alternative behaviors.     Treatment plan:  Target symptoms: anxiety , mood swings, work stress  Why chosen therapy is appropriate versus another modality: relevant to diagnosis  Outcome monitoring methods: self-report  Therapeutic intervention type: insight oriented psychotherapy, behavior modifying psychotherapy    Risk parameters:  Patient reports no suicidal ideation  Patient reports no homicidal ideation  Patient reports no self-injurious behavior  Patient reports no violent behavior    Verbal deficits: None    Patient's response to intervention:  The patient's response to intervention is accepting.    Progress toward goals and other mental status changes:  The patient's progress toward goals is fair .    Diagnosis:    ICD-10-CM ICD-9-CM   1. Borderline personality disorder  F60.3 " 301.83        Plan:  individual psychotherapy    Return to clinic: 2 weeks    Length of Service (minutes): 60      Jero Baker,  Adult Psychology Doctoral Intern  [facility]

## 2023-11-10 ENCOUNTER — PATIENT MESSAGE (OUTPATIENT)
Dept: PSYCHIATRY | Facility: CLINIC | Age: 38
End: 2023-11-10
Payer: COMMERCIAL

## 2023-11-20 ENCOUNTER — PATIENT MESSAGE (OUTPATIENT)
Dept: PSYCHIATRY | Facility: CLINIC | Age: 38
End: 2023-11-20
Payer: COMMERCIAL

## 2023-12-01 ENCOUNTER — CLINICAL SUPPORT (OUTPATIENT)
Dept: PSYCHIATRY | Facility: CLINIC | Age: 38
End: 2023-12-01
Payer: COMMERCIAL

## 2023-12-01 DIAGNOSIS — F60.3 BORDERLINE PERSONALITY DISORDER: Primary | ICD-10-CM

## 2023-12-01 PROCEDURE — 90853 PR GROUP PSYCHOTHERAPY: ICD-10-PCS | Mod: S$GLB,,,

## 2023-12-01 PROCEDURE — 90853 GROUP PSYCHOTHERAPY: CPT | Mod: S$GLB,,,

## 2023-12-01 NOTE — PROGRESS NOTES
Group Psychotherapy (PhD/LCSW)    Site: [facility]    Clinical status of patient: Intensive Outpatient Program (IOP)    Date: 12/1/2023    Group Focus: Psychodynamic Process Group    Length of service: 42273 - 45-50 minutes    Number of patients in attendance: 6    Referred by: [facility]     Target symptoms: Anxiety and BPD    Patient's response to treatment: Active Listening    Progress toward goals: Progressing adequately    Interval History: Patient reported that they have been feeling much more balanced and calm as of late. Patient reflected that their medication change has been helpful, although they were worried the effects would wear off in time. Patient offered support to other group members dealing with anger. Patient also sought support from the group about how to make space for her emotions without feeling overwhelmed. Patient reflected on a positive experience with her in-laws and gave herself credit for her treatment progress, which was reinforced by the group.       Diagnosis:     ICD-10-CM ICD-9-CM   1. Borderline personality disorder  F60.3 301.83         Plan: Continue treatment in aftercare group

## 2023-12-06 ENCOUNTER — CLINICAL SUPPORT (OUTPATIENT)
Dept: PSYCHIATRY | Facility: CLINIC | Age: 38
End: 2023-12-06
Payer: COMMERCIAL

## 2023-12-06 DIAGNOSIS — F60.3 BORDERLINE PERSONALITY DISORDER: Primary | ICD-10-CM

## 2023-12-06 PROCEDURE — 99499 NO LOS: ICD-10-PCS | Mod: S$GLB,,, | Performed by: PSYCHOLOGIST

## 2023-12-06 PROCEDURE — 99499 UNLISTED E&M SERVICE: CPT | Mod: S$GLB,,, | Performed by: PSYCHOLOGIST

## 2023-12-06 NOTE — PROGRESS NOTES
"    12/6/2023    Site:  [facility]         Therapeutic Intervention: Met with patient.  Outpatient - Insight oriented psychotherapy 60 min - CPT code 27821 and Outpatient - Supportive psychotherapy 60 min - CPT Code 36900    Chief complaint/reason for encounter: mood swings, mood elevation, anxiety, and behavior     Interval history and content of current session:   Mrs. Tuttle arrived on time for her appointment. She appeared euthymic and her reported mood was congruent. She provided an update of her week since the last session. Engaged in practicing alternative behaviors, and challenged unhelpful thoughts allowing her to rebuild connections with in-laws.Patient reflected on progress  made in reglating emotions. Therapist explored feeling "unfulfilled" with the patient. Therapist discussed gratitude, and guided patient through a gratitude focused exercise.   The patient remained engaged throughout the session.     Practice Assignments  - Gratitude activity   - Challenge unhelpfulinking, and engage in alternative behaviors.     Treatment plan:  Target symptoms: anxiety , mood swings, work stress  Why chosen therapy is appropriate versus another modality: relevant to diagnosis  Outcome monitoring methods: self-report  Therapeutic intervention type: insight oriented psychotherapy, behavior modifying psychotherapy    Risk parameters:  Patient reports no suicidal ideation  Patient reports no homicidal ideation  Patient reports no self-injurious behavior  Patient reports no violent behavior    Verbal deficits: None    Patient's response to intervention:  The patient's response to intervention is accepting.    Progress toward goals and other mental status changes:  The patient's progress toward goals is fair .    Diagnosis:    ICD-10-CM ICD-9-CM   1. Borderline personality disorder  F60.3 301.83        Plan:  individual psychotherapy    Return to clinic: 2 weeks    Length of Service (minutes): 60      Justice S. " Samuel,  Adult Psychology Doctoral Intern  [facility]

## 2023-12-08 ENCOUNTER — PATIENT MESSAGE (OUTPATIENT)
Dept: PSYCHIATRY | Facility: CLINIC | Age: 38
End: 2023-12-08
Payer: COMMERCIAL

## 2023-12-15 ENCOUNTER — CLINICAL SUPPORT (OUTPATIENT)
Dept: PSYCHIATRY | Facility: CLINIC | Age: 38
End: 2023-12-15
Payer: COMMERCIAL

## 2023-12-15 DIAGNOSIS — F60.3 BORDERLINE PERSONALITY DISORDER: Primary | ICD-10-CM

## 2023-12-15 PROCEDURE — 90853 PR GROUP PSYCHOTHERAPY: ICD-10-PCS | Mod: S$GLB,,,

## 2023-12-15 PROCEDURE — 90853 GROUP PSYCHOTHERAPY: CPT | Mod: S$GLB,,,

## 2023-12-15 NOTE — PROGRESS NOTES
"    Group Psychotherapy (PhD/LCSW)    Site: [facility]    Clinical status of patient: Intensive Outpatient Program (IOP)    Date: 12/15/2023    Group Focus: Psychodynamic Process Group    Length of service: 59395 - 45-50 minutes    Number of patients in attendance: 4    Referred by: [facility]     Target symptoms: Anxiety and BPD    Patient's response to treatment: Active Listening    Progress toward goals: Progressing adequately    Interval History: Patient shared that she likewise wishes her family would attend more to her feelings and ask if she was doing okay. Patient also reflected that she also negatively reacts when her family checks in on her and feels shame and becomes self-critical. Clinician and group developed coping ahead strategies built around having a stock phrase to use when her partner asks about her time in therapy. "I appreciate you asking me, I want you to keep asking me about therapy, but I am not willing to share right now".       Diagnosis:     ICD-10-CM ICD-9-CM   1. Borderline personality disorder  F60.3 301.83           Plan: Continue treatment in aftercare group   "

## 2023-12-18 ENCOUNTER — PATIENT MESSAGE (OUTPATIENT)
Dept: PSYCHIATRY | Facility: CLINIC | Age: 38
End: 2023-12-18
Payer: COMMERCIAL

## 2023-12-20 ENCOUNTER — CLINICAL SUPPORT (OUTPATIENT)
Dept: PSYCHIATRY | Facility: CLINIC | Age: 38
End: 2023-12-20
Payer: COMMERCIAL

## 2023-12-20 ENCOUNTER — PATIENT MESSAGE (OUTPATIENT)
Dept: PSYCHIATRY | Facility: CLINIC | Age: 38
End: 2023-12-20
Payer: COMMERCIAL

## 2023-12-20 DIAGNOSIS — F60.3 BORDERLINE PERSONALITY DISORDER: Primary | ICD-10-CM

## 2023-12-20 PROCEDURE — 99499 UNLISTED E&M SERVICE: CPT | Mod: S$GLB,,, | Performed by: PSYCHOLOGIST

## 2023-12-20 PROCEDURE — 99499 NO LOS: ICD-10-PCS | Mod: S$GLB,,, | Performed by: PSYCHOLOGIST

## 2023-12-20 NOTE — PROGRESS NOTES
"Individual Psychotherapy (PhD/LCSW)    12/20/2023    Site:  [facility]         Therapeutic Intervention: Met with patient.  Outpatient - Insight oriented psychotherapy 60 min - CPT code 85597 and Outpatient - Supportive psychotherapy 60 min - CPT Code 52815    Chief complaint/reason for encounter: mood swings, mood elevation, anxiety, and behavior     Interval history and content of current session:   Mrs. Tuttle arrived on time for her appointment. She appeared euthymic and her reported mood was congruent. Patient reported practicing gratitude increased her awareness of how supportive and thoughtful her  is. Patient shared progress in present moment awareness during social events, and discussed struggles with managing conflict. Therapist highlighted the patients strengths (exploring appropriate response, decreased avoidance). Therapist explored the impact of negative automatic thoughts with patient. The patient remained engaged throughout the session.     Practice Assignments  - Positive Self talk   - Challenge thinking traps (to generate other ways of thinking about emotional situations)  -Alternative behaviors (decrease "punishing" self")    Treatment plan:  Target symptoms: anxiety , mood swings, work stress  Why chosen therapy is appropriate versus another modality: relevant to diagnosis  Outcome monitoring methods: self-report  Therapeutic intervention type: insight oriented psychotherapy, behavior modifying psychotherapy    Risk parameters:  Patient reports no suicidal ideation  Patient reports no homicidal ideation  Patient reports no self-injurious behavior  Patient reports no violent behavior    Verbal deficits: None    Patient's response to intervention:  The patient's response to intervention is accepting.    Progress toward goals and other mental status changes:  The patient's progress toward goals is fair .    Diagnosis:    ICD-10-CM ICD-9-CM   1. Borderline personality disorder  F60.3 " 301.83        Plan:  individual psychotherapy    Return to clinic: 2 weeks    Length of Service (minutes): 60      Jero Baker,  Adult Psychology Doctoral Intern  [facility]

## 2023-12-21 ENCOUNTER — PATIENT MESSAGE (OUTPATIENT)
Dept: PSYCHIATRY | Facility: CLINIC | Age: 38
End: 2023-12-21
Payer: COMMERCIAL

## 2023-12-29 ENCOUNTER — CLINICAL SUPPORT (OUTPATIENT)
Dept: PSYCHIATRY | Facility: CLINIC | Age: 38
End: 2023-12-29
Payer: COMMERCIAL

## 2023-12-29 DIAGNOSIS — F60.3 BORDERLINE PERSONALITY DISORDER: Primary | ICD-10-CM

## 2023-12-29 PROCEDURE — 90853 GROUP PSYCHOTHERAPY: CPT | Mod: S$GLB,,,

## 2024-01-04 ENCOUNTER — PATIENT MESSAGE (OUTPATIENT)
Dept: PSYCHIATRY | Facility: CLINIC | Age: 39
End: 2024-01-04
Payer: COMMERCIAL

## 2024-01-05 ENCOUNTER — CLINICAL SUPPORT (OUTPATIENT)
Dept: PSYCHIATRY | Facility: CLINIC | Age: 39
End: 2024-01-05
Payer: COMMERCIAL

## 2024-01-05 DIAGNOSIS — F60.3 BORDERLINE PERSONALITY DISORDER: Primary | ICD-10-CM

## 2024-01-05 PROCEDURE — 90853 GROUP PSYCHOTHERAPY: CPT | Mod: S$GLB,,,

## 2024-01-05 NOTE — PROGRESS NOTES
Group Psychotherapy (PhD/LCSW)    Site: [facility]    Clinical status of patient: Intensive Outpatient Program (IOP)    Date: 1/5/2024    Group Focus: Psychodynamic Process Group    Length of service: 74498 - 45-50 minutes    Number of patients in attendance: 4    Referred by: [facility]     Target symptoms: Anxiety and BPD    Patient's response to treatment: Active Listening    Progress toward goals: Progressing adequately    Interval History: Patient supported fellow group members in their search for finding more effective communication strategies with their family members. Patient reflected on her experiences as a young person and how she would receive the communications that were proposed, and suggested alternatives. Patient also offered  and support to group members who were struggling with complex medical issues.       Diagnosis:     ICD-10-CM ICD-9-CM   1. Borderline personality disorder  F60.3 301.83             Plan: Continue treatment in aftercare group

## 2024-01-08 DIAGNOSIS — F43.10 PTSD (POST-TRAUMATIC STRESS DISORDER): Primary | ICD-10-CM

## 2024-01-08 RX ORDER — ESCITALOPRAM OXALATE 20 MG/1
20 TABLET ORAL DAILY
Qty: 30 TABLET | Refills: 11 | Status: CANCELLED | OUTPATIENT
Start: 2024-01-08 | End: 2025-01-07

## 2024-01-09 ENCOUNTER — PATIENT MESSAGE (OUTPATIENT)
Dept: PSYCHIATRY | Facility: CLINIC | Age: 39
End: 2024-01-09
Payer: COMMERCIAL

## 2024-01-09 RX ORDER — LORAZEPAM 0.5 MG/1
0.5 TABLET ORAL DAILY PRN
Qty: 15 TABLET | Refills: 0 | Status: SHIPPED | OUTPATIENT
Start: 2024-01-09

## 2024-01-09 NOTE — TELEPHONE ENCOUNTER
Patient of Dr. Mcknight (currently on extended leave) requesting lorazepam refill. L/s in October when plan made to continue meds unchanged. Pt engaged in regular group therapy since that time. Does not yet have f/u with Dr. Mcknight upon her return scheduled; pt will be encouraged to schedule. [place] PDMP reviewed- no irregularities noted. Will consult with Dr. Mcguire for review/approval, as deemed appropriate, of one month supply of requested med.

## 2024-01-10 ENCOUNTER — CLINICAL SUPPORT (OUTPATIENT)
Dept: PSYCHIATRY | Facility: CLINIC | Age: 39
End: 2024-01-10
Payer: COMMERCIAL

## 2024-01-10 DIAGNOSIS — F60.3 BORDERLINE PERSONALITY DISORDER: Primary | ICD-10-CM

## 2024-01-10 PROCEDURE — 99499 UNLISTED E&M SERVICE: CPT | Mod: S$GLB,,, | Performed by: PSYCHOLOGIST

## 2024-01-10 NOTE — PROGRESS NOTES
"Individual Psychotherapy (PhD/LCSW)    1/10/2024    Site:  [facility]         Therapeutic Intervention: Met with patient.  Outpatient - Insight oriented psychotherapy 60 min - CPT code 85129 and Outpatient - Supportive psychotherapy 60 min - CPT Code 85879    Chief complaint/reason for encounter: mood swings, mood elevation, anxiety, and behavior     Interval history and content of current session:   Mrs. Tuttle arrived on time for her appointment. She appeared euthymic and her reported mood was congruent. Patient reported things are going well. Patient reported feeling displeased with currently feeling "indifferent," she stated "I feel like I am making a problem out of nothing." Therapist normalized the patients emotional experience to reduce negative self talk. Therapist highlighted the patients strengths and progress. Therapist helped patient to identify value based activities. Therapist guided patient in creating a plan to engage in meaningful activities. Patient remained engaged throughout the session.       Practice Assignments  - Behavioral activation (physical activity, volunteering, Follow up with DBT ref)      Treatment plan:  Target symptoms: anxiety , mood swings, work stress  Why chosen therapy is appropriate versus another modality: relevant to diagnosis  Outcome monitoring methods: self-report  Therapeutic intervention type: insight oriented psychotherapy, behavior modifying psychotherapy    Risk parameters:  Patient reports no suicidal ideation  Patient reports no homicidal ideation  Patient reports no self-injurious behavior  Patient reports no violent behavior    Verbal deficits: None    Patient's response to intervention:  The patient's response to intervention is accepting.    Progress toward goals and other mental status changes:  The patient's progress toward goals is fair .    Diagnosis:    ICD-10-CM ICD-9-CM   1. Borderline personality disorder  F60.3 301.83        Plan:  individual " psychotherapy    Return to clinic: 2 weeks    Length of Service (minutes): 60      Jero Baker,  Adult Psychology Doctoral Intern  [facility]
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
## 2024-02-02 ENCOUNTER — PATIENT MESSAGE (OUTPATIENT)
Dept: PSYCHIATRY | Facility: CLINIC | Age: 39
End: 2024-02-02
Payer: COMMERCIAL

## 2024-02-21 ENCOUNTER — CLINICAL SUPPORT (OUTPATIENT)
Dept: PSYCHIATRY | Facility: CLINIC | Age: 39
End: 2024-02-21
Payer: COMMERCIAL

## 2024-02-21 DIAGNOSIS — F60.3 BORDERLINE PERSONALITY DISORDER: Primary | ICD-10-CM

## 2024-02-21 PROCEDURE — 99499 UNLISTED E&M SERVICE: CPT | Mod: S$GLB,,, | Performed by: PSYCHOLOGIST

## 2024-02-21 NOTE — PROGRESS NOTES
"Individual Psychotherapy (PhD/LCSW)    2/21/2024    Site:  [facility]         Therapeutic Intervention: Met with patient.  Outpatient - Insight oriented psychotherapy 60 min - CPT code 47135 and Outpatient - Supportive psychotherapy 60 min - CPT Code 35086    Chief complaint/reason for encounter: mood swings, mood elevation, anxiety, and behavior     Interval history and content of current session:   Mrs. Tuttle arrived on time for her appointment. She appeared euthymic and her reported mood was congruent. Patient provided a check-in, and reported work has been increasingly more busy in a positive way. Patient reported increase in emotional regulation, decrease in rumination, and worry related to "returning to the past". Therapist explored the patients worry, and provided a cognitive re-frame that the patient accepted. Therapist collaborated with patient and updated treatment goals and discussed termination.          Treatment plan:  Target symptoms: anxiety , mood swings, work stress  Why chosen therapy is appropriate versus another modality: relevant to diagnosis  Outcome monitoring methods: self-report  Therapeutic intervention type: insight oriented psychotherapy, behavior modifying psychotherapy    Risk parameters:  Patient reports no suicidal ideation  Patient reports no homicidal ideation  Patient reports no self-injurious behavior  Patient reports no violent behavior    Verbal deficits: None    Patient's response to intervention:  The patient's response to intervention is accepting.    Progress toward goals and other mental status changes:  The patient's progress toward goals is fair .    Diagnosis:      Plan:  individual psychotherapy    ICD-10-CM ICD-9-CM   1. Borderline personality disorder  F60.3 301.83      Return to clinic: 2 weeks    Length of Service (minutes): 60      Jero Baker,  Adult Psychology Doctoral Intern  [facility]      "

## 2024-03-06 ENCOUNTER — CLINICAL SUPPORT (OUTPATIENT)
Dept: PSYCHIATRY | Facility: CLINIC | Age: 39
End: 2024-03-06
Payer: COMMERCIAL

## 2024-03-06 DIAGNOSIS — F60.3 BORDERLINE PERSONALITY DISORDER: Primary | ICD-10-CM

## 2024-03-06 PROCEDURE — 99499 UNLISTED E&M SERVICE: CPT | Mod: S$GLB,,, | Performed by: PSYCHOLOGIST

## 2024-03-06 NOTE — PROGRESS NOTES
"Individual Psychotherapy (PhD/LCSW)    3/6/2024    Site:  [facility]         Therapeutic Intervention: Met with patient.  Outpatient - Insight oriented psychotherapy 60 min - CPT code 76257 and Outpatient - Supportive psychotherapy 60 min - CPT Code 07486    Chief complaint/reason for encounter: mood swings, mood elevation, anxiety, and behavior     Interval history and content of current session:   Mrs. Tuttle arrived on time for her appointment. She appeared euthymic and her reported mood was congruent. Patient  focused on anticipatory worries with engaging with inlaws, and expressed none of her worries come to fruition. Patient expressed experiencing negative thoughts and a sad feeling that overcomes her body, and gloominess which causes her to isolate and increase sleep. Therapist guided patient in utilizing a CBT techniques (thought challenging, thought stopping) to increase an objective perspective rather than assuming her thoughts are facts or "the truth," and disrupt negative thinking patterns and redirect thoughts to something that helps relieve distress. Therapist discussed preparation for termination and introduced patient to therapist maintence plan. Patient was receptive and remained engaged throughout the session.       Practice Assignment  -Engage in thought challenging & thought stopping   - Therapist maintenance worksheet    Treatment plan:  Target symptoms: anxiety , mood swings, work stress  Why chosen therapy is appropriate versus another modality: relevant to diagnosis  Outcome monitoring methods: self-report  Therapeutic intervention type: insight oriented psychotherapy, behavior modifying psychotherapy    Risk parameters:  Patient reports no suicidal ideation  Patient reports no homicidal ideation  Patient reports no self-injurious behavior  Patient reports no violent behavior    Verbal deficits: None    Patient's response to intervention:  The patient's response to intervention is " accepting.    Progress toward goals and other mental status changes:  The patient's progress toward goals is good.    Diagnosis:    ICD-10-CM ICD-9-CM   1. Borderline personality disorder  F60.3 301.83        Plan:  individual psychotherapy; Will meet with patient for 3 additional sessions until patient completes treatment.        Return to clinic: 3 weeks    Length of Service (minutes): 60      Jero Baker,  Adult Psychology Doctoral Intern  [facility]

## 2024-03-14 ENCOUNTER — PATIENT MESSAGE (OUTPATIENT)
Dept: PSYCHIATRY | Facility: CLINIC | Age: 39
End: 2024-03-14
Payer: COMMERCIAL

## 2024-03-25 ENCOUNTER — CLINICAL SUPPORT (OUTPATIENT)
Dept: PSYCHIATRY | Facility: CLINIC | Age: 39
End: 2024-03-25
Payer: COMMERCIAL

## 2024-03-25 DIAGNOSIS — F60.3 BORDERLINE PERSONALITY DISORDER: Primary | ICD-10-CM

## 2024-03-25 PROCEDURE — 99499 UNLISTED E&M SERVICE: CPT | Mod: S$GLB,,, | Performed by: PSYCHOLOGIST

## 2024-03-25 NOTE — PROGRESS NOTES
"Individual Psychotherapy (PhD/LCSW)    3/25/2024    Site:  [facility]         Therapeutic Intervention: Met with patient.  Outpatient - Insight oriented psychotherapy 60 min - CPT code 45692 and Outpatient - Supportive psychotherapy 60 min - CPT Code 36564    Chief complaint/reason for encounter: mood swings, mood elevation, anxiety, and behavior     Interval history and content of current session:   Mrs. Tuttle arrived on time for her appointment. She appeared euthymic and her reported mood was congruent. Patient reported she was able to overcome her "sad" mood. Patient reported an increase in emotional regulation, and a decrease in negative self talk. Patient focused on challenges in consistently engaging value based activities. Therapist helped patient identify how engaging in over thinking/ rumination was leading to inactivity. Therapist guided patient in identifying habitual self-stigmatizing thoughts to increase awareness, think through whether those thoughts are helpful and valid.Patient was receptive and remained engaged throughout the session.    Practice Assignment  -Engage in value based activities (health)  - Continue Therapist maintenance worksheet    Treatment plan:  Target symptoms: anxiety , mood swings, work stress  Why chosen therapy is appropriate versus another modality: relevant to diagnosis  Outcome monitoring methods: self-report  Therapeutic intervention type: insight oriented psychotherapy, behavior modifying psychotherapy    Risk parameters:  Patient reports no suicidal ideation  Patient reports no homicidal ideation  Patient reports no self-injurious behavior  Patient reports no violent behavior    Verbal deficits: None    Patient's response to intervention:  The patient's response to intervention is accepting.    Progress toward goals and other mental status changes:  The patient's progress toward goals is good.    Diagnosis:    ICD-10-CM ICD-9-CM   1. Borderline personality " disorder  F60.3 301.83           Plan:  individual psychotherapy; Will meet with patient for 2 additional sessions until patient completes treatment.        Return to clinic: 3 weeks    Length of Service (minutes): 60      Jero Baker,  Adult Psychology Doctoral Intern  [facility]

## 2024-05-03 ENCOUNTER — ON-DEMAND VIRTUAL (OUTPATIENT)
Dept: URGENT CARE | Facility: CLINIC | Age: 39
End: 2024-05-03
Payer: COMMERCIAL

## 2024-05-03 DIAGNOSIS — R10.32 ABDOMINAL PAIN, LLQ: Primary | ICD-10-CM

## 2024-05-03 PROCEDURE — 99202 OFFICE O/P NEW SF 15 MIN: CPT | Mod: 95,,, | Performed by: NURSE PRACTITIONER

## 2024-05-03 NOTE — PROGRESS NOTES
Subjective:      Patient ID: Milvia Tuttle is a 38 y.o. female.    Vitals:  vitals were not taken for this visit.     Chief Complaint: Abdominal Cramping      Visit Type: TELE AUDIOVISUAL    Present with the patient at the time of consultation: TELEMED PRESENT WITH PATIENT: None    Location: Home in [place]    Past Medical History:   Diagnosis Date    Abnormal Pap smear of cervix     s/p leep procedure    Anxiety     Depression     Migraine with aura     Vertigo      Past Surgical History:   Procedure Laterality Date    ADENOIDECTOMY       Review of patient's allergies indicates:  No Known Allergies  Current Outpatient Medications on File Prior to Visit   Medication Sig Dispense Refill    EScitalopram oxalate (LEXAPRO) 20 MG tablet Take 1 tablet (20 mg total) by mouth once daily. 30 tablet 11    fluticasone propionate (FLONASE) 50 mcg/actuation nasal spray 1 spray (50 mcg total) by Each Nostril route once daily. 11.1 mL 1    levocetirizine (XYZAL) 5 MG tablet Take 1 tablet (5 mg total) by mouth every evening. (Patient not taking: Reported on 7/18/2023) 30 tablet 3    LORazepam (ATIVAN) 0.5 MG tablet Take 1 tablet (0.5 mg total) by mouth daily as needed for Anxiety (anxiety, dysregulation). 15 tablet 0    rOPINIRole (REQUIP) 0.25 MG tablet Take 1 tablet (0.25 mg total) by mouth 2 (two) times daily as needed. 60 tablet 11    traZODone (DESYREL) 50 MG tablet Take 2 tablets (100 mg total) by mouth nightly as needed for Insomnia (Take 1-2 pills as needed nightly for sleep). 60 tablet 11    valacyclovir (VALTREX) 1000 MG tablet TK 2 TS PO Q 12 H PRN FOR 1 DAY FOR FEVER BLISTER  1     No current facility-administered medications on file prior to visit.     Family History   Adopted: Yes   Problem Relation Name Age of Onset    Heart disease Mother          unknown           Oh[facility] Peq Odvv Intake    5/3/2024 11:36 AM CDT - Filed by Patient   What is your current physical address in the event of a medical emergency? [address] [place] [address]  [place] [address]   Are you able to take your vital signs? No   Please attach any relevant images or files          Pt reports she's trying to avoid going to the doctor. For 1.5 weeks, has had random sharp pains to left lower abdomen. Reports sand colored stool, which is similar to what her sister had when she had gallbladder problems. Reports loose stools most days, even prior to this. Denies fever.     Abdominal Cramping  The current episode started 1 to 4 weeks ago. The onset quality is sudden. The pain is located in the LLQ. The quality of the pain is sharp and burning. The abdominal pain radiates to the pelvis. Associated symptoms include diarrhea. Pertinent negatives include no fever, flatus, nausea or vomiting.       Constitution: Negative for fever.   Gastrointestinal:  Positive for abdominal pain and diarrhea. Negative for abdominal bloating, nausea and vomiting.        Objective:   The physical exam was conducted virtually.  Physical Exam   Constitutional: She is oriented to person, place, and time. No distress.   HENT:   Head: Normocephalic.   Nose: Nose normal.   Eyes: Conjunctivae are normal. No scleral icterus.   Pulmonary/Chest: Effort normal. No respiratory distress.   Abdominal: Soft. There is abdominal tenderness (to suprapubic area intermittently). There is no guarding.   Musculoskeletal: Normal range of motion.         General: Normal range of motion.   Neurological: She is alert and oriented to person, place, and time.   Skin: Skin is not diaphoretic.   Psychiatric: Her behavior is normal. Judgment and thought content normal.       Assessment:     1. Abdominal pain, LLQ        Plan:       Abdominal pain, LLQ        Rest. Drink plenty of fluids.    Follow-up with PCP or urgent care for recheck in 1-3 days. It's imperative for you to have an in-person exam.    Report for immediate medical care for symptoms including but not limited to: intractable vomiting, severe or worsening abdominal pain,  worsening weakness, black or bloody stools, fainting, etc.    All questions were answered to the best of my abilities and all concerns were addressed at this time.     Follow up:   1. Please schedule a virtual follow up visit as needed.  2. Please see an in-person provider if your symptoms are worsening or not improving in 2-3 days.  3. Please print a copy of this note and send it to your regular doctor or take it to your next visit so it may be included in your medical record.   4. You must understand that you've received Telehealth Urgent Care treatment only and that you may be released before all your medical problems are known or treated. You, the patient, will arrange for follow up care as instructed.  5. Follow up with your PCP or specialty clinic as directed. To schedule an appointment with the appropriate provider with [facility], please call [phone]. For pediatric referrals, please call [phone]  6. Contact customer support at [phone] for questions or concerns  7. For prescription questions or problems, call [phone] anytime for assistance.  8. For [facility] support, call [phone].

## 2024-05-04 ENCOUNTER — HOSPITAL ENCOUNTER (EMERGENCY)
Facility: HOSPITAL | Age: 39
Discharge: HOME | End: 2024-05-04
Attending: EMERGENCY MEDICINE
Payer: COMMERCIAL

## 2024-05-04 VITALS
HEART RATE: 85 BPM | OXYGEN SATURATION: 98 % | TEMPERATURE: 98 F | DIASTOLIC BLOOD PRESSURE: 76 MMHG | RESPIRATION RATE: 16 BRPM | SYSTOLIC BLOOD PRESSURE: 119 MMHG | HEIGHT: 68 IN | BODY MASS INDEX: 29.55 KG/M2 | WEIGHT: 195 LBS

## 2024-05-04 DIAGNOSIS — R10.2 PELVIC PAIN: ICD-10-CM

## 2024-05-04 DIAGNOSIS — D25.9 UTERINE LEIOMYOMA, UNSPECIFIED LOCATION: Primary | ICD-10-CM

## 2024-05-04 LAB
ALBUMIN SERPL BCP-MCNC: 3.8 G/DL (ref 3.5–5.2)
ALP SERPL-CCNC: 47 U/L (ref 55–135)
ALT SERPL W/O P-5'-P-CCNC: 16 U/L (ref 10–44)
ANION GAP SERPL CALC-SCNC: 10 MMOL/L (ref 8–16)
AST SERPL-CCNC: 15 U/L (ref 10–40)
B-HCG UR QL: NEGATIVE
BACTERIA GENITAL QL WET PREP: ABNORMAL
BASOPHILS # BLD AUTO: 0.03 K/UL (ref 0–0.2)
BASOPHILS NFR BLD: 0.4 % (ref 0–1.9)
BILIRUB SERPL-MCNC: 0.5 MG/DL (ref 0.1–1)
BILIRUB UR QL STRIP: NEGATIVE
BUN SERPL-MCNC: 12 MG/DL (ref 6–20)
CALCIUM SERPL-MCNC: 9.6 MG/DL (ref 8.7–10.5)
CHLORIDE SERPL-SCNC: 105 MMOL/L (ref 95–110)
CLARITY UR REFRACT.AUTO: CLEAR
CLUE CELLS VAG QL WET PREP: ABNORMAL
CO2 SERPL-SCNC: 23 MMOL/L (ref 23–29)
COLOR UR AUTO: COLORLESS
CREAT SERPL-MCNC: 0.8 MG/DL (ref 0.5–1.4)
CTP QC/QA: YES
DIFFERENTIAL METHOD BLD: ABNORMAL
EOSINOPHIL # BLD AUTO: 0.1 K/UL (ref 0–0.5)
EOSINOPHIL NFR BLD: 1.4 % (ref 0–8)
ERYTHROCYTE [DISTWIDTH] IN BLOOD BY AUTOMATED COUNT: 12.6 % (ref 11.5–14.5)
EST. GFR  (NO RACE VARIABLE): >60 ML/MIN/1.73 M^2
FILAMENT FUNGI VAG WET PREP-#/AREA: ABNORMAL
GLUCOSE SERPL-MCNC: 79 MG/DL (ref 70–110)
GLUCOSE UR QL STRIP: NEGATIVE
HCT VFR BLD AUTO: 42.8 % (ref 37–48.5)
HGB BLD-MCNC: 14.5 G/DL (ref 12–16)
HGB UR QL STRIP: NEGATIVE
IMM GRANULOCYTES # BLD AUTO: 0.01 K/UL (ref 0–0.04)
IMM GRANULOCYTES NFR BLD AUTO: 0.1 % (ref 0–0.5)
KETONES UR QL STRIP: NEGATIVE
LEUKOCYTE ESTERASE UR QL STRIP: NEGATIVE
LIPASE SERPL-CCNC: 27 U/L (ref 4–60)
LYMPHOCYTES # BLD AUTO: 2.4 K/UL (ref 1–4.8)
LYMPHOCYTES NFR BLD: 33.1 % (ref 18–48)
MCH RBC QN AUTO: 32 PG (ref 27–31)
MCHC RBC AUTO-ENTMCNC: 33.9 G/DL (ref 32–36)
MCV RBC AUTO: 95 FL (ref 82–98)
MONOCYTES # BLD AUTO: 0.5 K/UL (ref 0.3–1)
MONOCYTES NFR BLD: 7.3 % (ref 4–15)
NEUTROPHILS # BLD AUTO: 4.2 K/UL (ref 1.8–7.7)
NEUTROPHILS NFR BLD: 57.7 % (ref 38–73)
NITRITE UR QL STRIP: NEGATIVE
NRBC BLD-RTO: 0 /100 WBC
PH UR STRIP: 6 [PH] (ref 5–8)
PLATELET # BLD AUTO: 248 K/UL (ref 150–450)
PMV BLD AUTO: 11.4 FL (ref 9.2–12.9)
POTASSIUM SERPL-SCNC: 3.8 MMOL/L (ref 3.5–5.1)
PROT SERPL-MCNC: 7.2 G/DL (ref 6–8.4)
PROT UR QL STRIP: NEGATIVE
RBC # BLD AUTO: 4.53 M/UL (ref 4–5.4)
SODIUM SERPL-SCNC: 138 MMOL/L (ref 136–145)
SP GR UR STRIP: 1 (ref 1–1.03)
SPECIMEN SOURCE: ABNORMAL
T VAGINALIS GENITAL QL WET PREP: ABNORMAL
URN SPEC COLLECT METH UR: ABNORMAL
WBC # BLD AUTO: 7.24 K/UL (ref 3.9–12.7)
WBC #/AREA VAG WET PREP: ABNORMAL
YEAST GENITAL QL WET PREP: ABNORMAL

## 2024-05-04 PROCEDURE — 85025 COMPLETE CBC W/AUTO DIFF WBC: CPT | Performed by: PHYSICIAN ASSISTANT

## 2024-05-04 PROCEDURE — 81003 URINALYSIS AUTO W/O SCOPE: CPT | Performed by: PHYSICIAN ASSISTANT

## 2024-05-04 PROCEDURE — 99284 EMERGENCY DEPT VISIT MOD MDM: CPT | Mod: 25

## 2024-05-04 PROCEDURE — 80053 COMPREHEN METABOLIC PANEL: CPT | Performed by: PHYSICIAN ASSISTANT

## 2024-05-04 PROCEDURE — 87210 SMEAR WET MOUNT SALINE/INK: CPT | Performed by: PHYSICIAN ASSISTANT

## 2024-05-04 PROCEDURE — 87591 N.GONORRHOEAE DNA AMP PROB: CPT | Performed by: PHYSICIAN ASSISTANT

## 2024-05-04 PROCEDURE — 81025 URINE PREGNANCY TEST: CPT | Performed by: PHYSICIAN ASSISTANT

## 2024-05-04 PROCEDURE — 83690 ASSAY OF LIPASE: CPT | Performed by: PHYSICIAN ASSISTANT

## 2024-05-04 NOTE — ED PROVIDER NOTES
"Encounter Date: 2024       History     Chief Complaint   Patient presents with    Abdominal Pain     X 2 weeks been having off and on sharp pain LLQ and in pelvic area. "Been putting off coming here". + diarrhea but it is not uncommon for her. Off and on nausea no vomiting      The history is provided by the patient and medical records. No  was used.     Milvia Tuttle is a 38 y.o. female with medical history of BPDO, Abnormal pap smear s/p leep procedure presenting to the ED with the chief complaint of abdominal pain.     Reports 2 weeks of intermittent LLQ/pelvic pain. Describes as a stabbing sensation lasting for a few seconds. Feels nauseated. No vomiting. Notes a few episodes of loose stools which is not unusual for her. No fever. She is in a monogamous relationship with her  and denies STD concerns. LMP was on 24. No history of abdominal surgeries. Notes history of a few abnormal pap smears. OBGYN is at [facility].    Review of patient's allergies indicates:  No Known Allergies  Past Medical History:   Diagnosis Date    Abnormal Pap smear of cervix     s/p leep procedure    Anxiety     Borderline personality disorder     Depression     Migraine with aura     Vertigo      Past Surgical History:   Procedure Laterality Date    ADENOIDECTOMY      LOOP ELECTROSURGICAL EXCISION PROCEDURE (LEEP)       Family History   Adopted: Yes   Problem Relation Name Age of Onset    Heart disease Mother          unknown     Social History     Tobacco Use    Smoking status: Former     Current packs/day: 0.00     Average packs/day: 0.3 packs/day for 10.0 years (2.5 ttl pk-yrs)     Types: Cigarettes     Start date: 2000     Quit date: 2010     Years since quittin.2    Smokeless tobacco: Never   Substance Use Topics    Alcohol use: Yes     Comment: socially- maybe 10 drinks a month    Drug use: Not Currently     Review of Systems   Gastrointestinal:  Positive for abdominal pain. "       Physical Exam     Initial Vitals [05/04/24 1443]   BP Pulse Resp Temp SpO2   (!) 140/80 96 20 99 °F (37.2 °C) 100 %      MAP       --         Physical Exam    Constitutional: She appears well-developed and well-nourished. She is not diaphoretic. No distress.   HENT:   Head: Normocephalic and atraumatic.   Mouth/Throat: Oropharynx is clear and moist. No oropharyngeal exudate.   Eyes: Conjunctivae and EOM are normal. Pupils are equal, round, and reactive to light. No scleral icterus.   Neck: Neck supple.   Normal range of motion.  Cardiovascular:  Normal rate and regular rhythm.           Pulmonary/Chest: Breath sounds normal. No respiratory distress. She has no wheezes.   Abdominal: Abdomen is soft. She exhibits no distension. There is no abdominal tenderness. There is no rebound.   Genitourinary:    Genitourinary Comments: Pelvic: No external rash or tenderness. Small amount of white fluid in vault. No CMT or adnexal tenderness. Chaperone present.      Musculoskeletal:         General: No tenderness or edema. Normal range of motion.      Cervical back: Normal range of motion and neck supple.     Neurological: She is alert and oriented to person, place, and time. She has normal strength. No sensory deficit.   Skin: Skin is warm and dry. No rash noted. No erythema.   Psychiatric: She has a normal mood and affect.         ED Course   Procedures  Labs Reviewed   VAGINAL SCREEN - Abnormal; Notable for the following components:       Result Value    WBC - Vaginal Screen Rare (*)     Bacteria - Vaginal Screen Few (*)     All other components within normal limits    Narrative:     Release to patient->Immediate   COMPREHENSIVE METABOLIC PANEL - Abnormal; Notable for the following components:    Alkaline Phosphatase 47 (*)     All other components within normal limits   CBC W/ AUTO DIFFERENTIAL - Abnormal; Notable for the following components:    MCH 32.0 (*)     All other components within normal limits   URINALYSIS,  REFLEX TO URINE CULTURE - Abnormal; Notable for the following components:    Color, UA Colorless (*)     Specific Severy, UA 1.000 (*)     All other components within normal limits    Narrative:     Specimen Source->Urine   LIPASE   C. TRACHOMATIS/N. GONORRHOEAE BY AMP DNA   POCT URINE PREGNANCY          Imaging Results              US Pelvis Comp with Transvag NON-OB (xpd (Final result)  Result time 05/04/24 18:10:03      Final result by Sebastian Holt MD (05/04/24 18:10:03)                   Impression:      Multiple uterine fibroids.    No acute sonographic abnormality.    Left ovarian 1.2 cm dominant follicle.  In the setting of left lower quadrant/pelvic pain, follow-up pelvic ultrasound in 6-8 weeks can be obtained to ensure stability/resolution.    Electronically signed by resident: Alysia Lovell  Date:    05/04/2024  Time:    18:00    Electronically signed by: Sebastian Holt MD  Date:    05/04/2024  Time:    18:10               Narrative:    EXAMINATION:  US PELVIS COMP WITH TRANSVAG NON-OB (XPD)    CLINICAL HISTORY:  LLQ abd pain;    TECHNIQUE:  Transabdominal sonography of the pelvis was performed, followed by transvaginal sonography to better evaluate the uterus and ovaries.    COMPARISON:  None.    FINDINGS:  Uterus:    Size: 7.5 x 4.7 x 5.5 cm    Masses: Multiple uterine fibroids, the largest measures 2.6 x 2.2 x 2.7 cm.    Endometrium: Normal in this pre menopausal patient, measuring 5 mm.    Right ovary:    Size: 2.5 x 2.7 x 1.9 cm    Appearance: Normal    Vascular flow: Normal.    Left ovary:    Size: 2.3 x 1.6 x 1.8 cm    Appearance: Unremarkable.  1.2 x 0.9 x 1.1 cm simple appearing dominant follicle.    Vascular Flow: Normal.    Free Fluid:    No free fluid.    Multiple nabothian cysts.                                       Medications - No data to display  Medical Decision Making  38 y.o. female with medical history of BPDO, Abnormal pap smear s/p leep procedure presenting to the ED c/o  LLQ/pelvic pain beginning 2 weeks ago.     DDx includes but not limited to ovarian cyst, ovarian torsion, hernia, irritable bowel syndrome, cervicitis, PID, TOA, UTI, malignancy.     Amount and/or Complexity of Data Reviewed  Labs: ordered. Decision-making details documented in ED Course.  Radiology: ordered and independent interpretation performed.               ED Course as of 05/04/24 2114   Sat May 04, 2024   1832 Budding Yeast: None [BA]   1832 Clue Cells, Wet Prep: None [BA]   1832 Trichomonas: None [BA]   1832 Leukocyte Esterase, UA: Negative  UA negative for UTI [BA]   1832 Creatinine: 0.8 [BA]   1832 WBC: 7.24 [BA]   1832 Hemoglobin: 14.5 [BA]   1833 Hematocrit: 42.8 [BA]   1833 Pelvic US showing Multiple uterine fibroids.Left ovarian 1.2 cm dominant follice [BA]   1833 Resting comfortably. Afebrile and non-toxic appearing. Discussed US findings and advised outpatient follow-up with OBGYN. Her OBGYN is currently at [facility]. I placed a referral for [facility] in case should would like to switch. Patient expresses understanding and agreeable to the plan. Return to ED precautions given for new, worsening, or concerning symptoms.  [BA]      ED Course User Index  [BA] Sebastian Loredo PA-C                           Clinical Impression:  Final diagnoses:  [D25.9] Uterine leiomyoma, unspecified location (Primary)  [R10.2] Pelvic pain          ED Disposition Condition    Discharge Stable          ED Prescriptions    None       Follow-up Information       Follow up With Specialties Details Why Contact Info Additional Information    [facility]   [address]  [facility] [phone] [phone] Main Building, 5th Floor Please park in [facility] and take Clinic elevator             Sebastian Loredo PA-C  05/04/24 2115

## 2024-05-04 NOTE — ED NOTES
LOC: The patient is awake and alert; oriented x 3 and speaking appropriately.  APPEARANCE: Patient resting comfortably, patient is clean and well groomed  SKIN: warm and dry, normal skin turgor & moist mucus membranes, skin intact, no breakdown noted.  MUSCULOSKELETAL: Patient moving all extremities well, no obvious swelling or deformities noted  RESPIRATORY: Airway is open and patent, respirations are spontaneous, normal effort and rate  CARDIAC: Patient has a normal rate, no peripheral edema noted, capillary refill < 3 seconds; No complaints of chest pain   ABDOMEN: Soft and non tender to palpation, no distention noted. Pain in lefty side of abdome  radiating to pelvic area and vagina.

## 2024-05-04 NOTE — DISCHARGE INSTRUCTIONS
Follow-up with your OBGYN for further evaluation of your ultrasound findings. You may use the below contact information to follow-up with us at [facility].     Return to the emergency room for new, worsening, or concerning symptoms.     Future Appointments   Date Time Provider Department Center   5/27/2024  1:00 PM INTERN 2, [facility]

## 2024-05-04 NOTE — ED TRIAGE NOTES
" Onset 2 wks ago sudden sharp  pain in left abdomen, intermittently radiates into pelvic  area and in vagina. Intermittently  occurs 4 x day. Denies vag dc or odor.  Having a burning sensation in  left side of abdomen. States she had a "sand color stool" yesterday but today was normal. Denies dysuria or fever or chills.   "

## 2024-05-07 LAB
C TRACH DNA SPEC QL NAA+PROBE: NOT DETECTED
N GONORRHOEA DNA SPEC QL NAA+PROBE: NOT DETECTED

## 2024-05-27 ENCOUNTER — CLINICAL SUPPORT (OUTPATIENT)
Dept: PSYCHIATRY | Facility: CLINIC | Age: 39
End: 2024-05-27
Payer: COMMERCIAL

## 2024-05-27 ENCOUNTER — PATIENT MESSAGE (OUTPATIENT)
Dept: PSYCHIATRY | Facility: CLINIC | Age: 39
End: 2024-05-27
Payer: COMMERCIAL

## 2024-05-27 DIAGNOSIS — F60.3 BORDERLINE PERSONALITY DISORDER: Primary | ICD-10-CM

## 2024-05-27 PROCEDURE — 99499 UNLISTED E&M SERVICE: CPT | Mod: S$GLB,,, | Performed by: PSYCHOLOGIST

## 2024-05-27 NOTE — PROGRESS NOTES
Individual Psychotherapy (PhD/LCSW)    5/27/2024    Site:  [facility]         Therapeutic Intervention: Met with patient.  Outpatient - Insight oriented psychotherapy 60 min - CPT code 41319 and Outpatient - Supportive psychotherapy 60 min - CPT Code 36940    Chief complaint/reason for encounter: mood swings, mood elevation, anxiety, and behavior     Interval history and content of current session:   Mrs. Tuttle arrived on time for her appointment. She appeared euthymic and her reported mood was congruent. Patient provided check-in, reported an increase in emotional lability, and she reported more frequent incidents of emotional reactivity. Therapist reinforced the patient has shown good insight into her ability to clearly identify the situations that stir intense feelings of distress. Therapist provided positive feedback as the patient was accepting of the fact that she has held to distorted thoughts rather than realistic thoughts and that this distortion has increased her feelings of anger, sadness, and irritability. Therapist discussed the treatment plan with patient. Patient was receptive and remained engaged throughout the session.    Practice Assignment  -Challenging Thoughts worksheet   - Follow up with DBT referral     Treatment plan:  Target symptoms: anxiety , mood swings, work stress  Why chosen therapy is appropriate versus another modality: relevant to diagnosis  Outcome monitoring methods: self-report  Therapeutic intervention type: insight oriented psychotherapy, behavior modifying psychotherapy    Risk parameters:  Patient reports no suicidal ideation  Patient reports no homicidal ideation  Patient reports no self-injurious behavior  Patient reports no violent behavior    Verbal deficits: None    Patient's response to intervention:  The patient's response to intervention is accepting.    Progress toward goals and other mental status changes:  The patient's progress toward goals is  floridalma.    Diagnosis:    ICD-10-CM ICD-9-CM   1. Borderline personality disorder  F60.3 301.83           Plan:  individual psychotherapy; Will meet with patient for 2 additional sessions until patient completes treatment.        Return to clinic: 3 weeks    Length of Service (minutes): 60      Jero Baker,  Adult Psychology Doctoral Intern  [facility]

## 2024-06-04 ENCOUNTER — PATIENT MESSAGE (OUTPATIENT)
Dept: PSYCHIATRY | Facility: CLINIC | Age: 39
End: 2024-06-04
Payer: COMMERCIAL

## 2024-07-30 ENCOUNTER — LAB VISIT (OUTPATIENT)
Dept: LAB | Facility: OTHER | Age: 39
End: 2024-07-30
Attending: STUDENT IN AN ORGANIZED HEALTH CARE EDUCATION/TRAINING PROGRAM
Payer: COMMERCIAL

## 2024-07-30 ENCOUNTER — OFFICE VISIT (OUTPATIENT)
Dept: OBSTETRICS AND GYNECOLOGY | Facility: CLINIC | Age: 39
End: 2024-07-30
Payer: COMMERCIAL

## 2024-07-30 VITALS
DIASTOLIC BLOOD PRESSURE: 70 MMHG | BODY MASS INDEX: 28.07 KG/M2 | SYSTOLIC BLOOD PRESSURE: 106 MMHG | HEIGHT: 68 IN | WEIGHT: 185.19 LBS

## 2024-07-30 DIAGNOSIS — Z01.419 WELL WOMAN EXAM WITH ROUTINE GYNECOLOGICAL EXAM: ICD-10-CM

## 2024-07-30 DIAGNOSIS — Z12.31 BREAST CANCER SCREENING BY MAMMOGRAM: ICD-10-CM

## 2024-07-30 DIAGNOSIS — D25.9 UTERINE LEIOMYOMA, UNSPECIFIED LOCATION: ICD-10-CM

## 2024-07-30 DIAGNOSIS — Z01.419 WELL WOMAN EXAM WITH ROUTINE GYNECOLOGICAL EXAM: Primary | ICD-10-CM

## 2024-07-30 DIAGNOSIS — R10.2 PELVIC PAIN: ICD-10-CM

## 2024-07-30 PROCEDURE — 3074F SYST BP LT 130 MM HG: CPT | Mod: CPTII,S$GLB,, | Performed by: STUDENT IN AN ORGANIZED HEALTH CARE EDUCATION/TRAINING PROGRAM

## 2024-07-30 PROCEDURE — 88175 CYTOPATH C/V AUTO FLUID REDO: CPT | Performed by: PATHOLOGY

## 2024-07-30 PROCEDURE — 3008F BODY MASS INDEX DOCD: CPT | Mod: CPTII,S$GLB,, | Performed by: STUDENT IN AN ORGANIZED HEALTH CARE EDUCATION/TRAINING PROGRAM

## 2024-07-30 PROCEDURE — 36415 COLL VENOUS BLD VENIPUNCTURE: CPT | Performed by: STUDENT IN AN ORGANIZED HEALTH CARE EDUCATION/TRAINING PROGRAM

## 2024-07-30 PROCEDURE — 87624 HPV HI-RISK TYP POOLED RSLT: CPT | Performed by: STUDENT IN AN ORGANIZED HEALTH CARE EDUCATION/TRAINING PROGRAM

## 2024-07-30 PROCEDURE — 99385 PREV VISIT NEW AGE 18-39: CPT | Mod: S$GLB,,, | Performed by: STUDENT IN AN ORGANIZED HEALTH CARE EDUCATION/TRAINING PROGRAM

## 2024-07-30 PROCEDURE — 3078F DIAST BP <80 MM HG: CPT | Mod: CPTII,S$GLB,, | Performed by: STUDENT IN AN ORGANIZED HEALTH CARE EDUCATION/TRAINING PROGRAM

## 2024-07-30 PROCEDURE — 99999 PR PBB SHADOW E&M-EST. PATIENT-LVL IV: CPT | Mod: PBBFAC,,, | Performed by: STUDENT IN AN ORGANIZED HEALTH CARE EDUCATION/TRAINING PROGRAM

## 2024-07-30 PROCEDURE — 1160F RVW MEDS BY RX/DR IN RCRD: CPT | Mod: CPTII,S$GLB,, | Performed by: STUDENT IN AN ORGANIZED HEALTH CARE EDUCATION/TRAINING PROGRAM

## 2024-07-30 PROCEDURE — 82166 ASSAY ANTI-MULLERIAN HORM: CPT | Performed by: STUDENT IN AN ORGANIZED HEALTH CARE EDUCATION/TRAINING PROGRAM

## 2024-07-30 PROCEDURE — 1159F MED LIST DOCD IN RCRD: CPT | Mod: CPTII,S$GLB,, | Performed by: STUDENT IN AN ORGANIZED HEALTH CARE EDUCATION/TRAINING PROGRAM

## 2024-07-30 RX ORDER — DUPILUMAB 300 MG/2ML
INJECTION, SOLUTION SUBCUTANEOUS
COMMUNITY
Start: 2023-05-26

## 2024-07-30 NOTE — PROGRESS NOTES
[facility]  Obstetrics & Gynecology      History of Present Illness:   Annual, establish care, pap and possible conception desires  Menstrual History:  reports periods are regular.  5 days bleeding.  Denies hx of dysmenorrhea or menorrhagia.   Obstetric Hx: 1 ab, 2006  LMP: 7/28  STD/STI Hx: Denies any history of STD's  Contraception Hx: none  Sexually Active: one male partner  Family history: Adopted- unknown any personal or family history of GYN/colon cancers   Social: Wears seatbelts. Exercises sometimes. Feels safe at home. No severe depressive episodes recently. No issues with tobacco/vaping, EtOH, illicit drugs.  Last pap: abnormal, h/o HPV. H/o LEEP approx 12 year ago for high grade lesion.  More abnormal since then.  Last mammo: h/o 2 biopsies, gets mammograms q6 months, most recent in October 2023. Watching for density and growing cysts in left breast.   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  Can feel lump vs hemorrhoid.     Found to have fibroids and left ovarian cyst in 05/2024 after ED visit for pelvic pain.  No episodes of severe pain since.     Current Outpatient Medications on File Prior to Visit   Medication Sig    dupilumab (DUPIXENT PEN) 300 mg/2 mL PnIj     EScitalopram oxalate (LEXAPRO) 20 MG tablet Take 1 tablet (20 mg total) by mouth once daily.    fluticasone propionate (FLONASE) 50 mcg/actuation nasal spray 1 spray (50 mcg total) by Each Nostril route once daily.    LORazepam (ATIVAN) 0.5 MG tablet Take 1 tablet (0.5 mg total) by mouth daily as needed for Anxiety (anxiety, dysregulation).    traZODone (DESYREL) 50 MG tablet Take 2 tablets (100 mg total) by mouth nightly as needed for Insomnia (Take 1-2 pills as needed nightly for sleep).    valacyclovir (VALTREX) 1000 MG tablet TK 2 TS PO Q 12 H PRN FOR 1 DAY FOR FEVER BLISTER    levocetirizine (XYZAL) 5 MG tablet Take 1 tablet (5 mg total) by mouth every evening. (Patient not taking:  Reported on 2023)    rOPINIRole (REQUIP) 0.25 MG tablet Take 1 tablet (0.25 mg total) by mouth 2 (two) times daily as needed.     No current facility-administered medications on file prior to visit.       Review of patient's allergies indicates:  No Known Allergies    Past Medical History:   Diagnosis Date    Abnormal Pap smear of cervix     s/p leep procedure    Anxiety     Borderline personality disorder     Depression     Migraine with aura     Vertigo      OB History   No obstetric history on file.     Past Surgical History:   Procedure Laterality Date    ADENOIDECTOMY      anal wart      it was cancerous removed then    anal wart       LOOP ELECTROSURGICAL EXCISION PROCEDURE (LEEP)       Family History       Problem Relation (Age of Onset)    Heart disease Mother          Tobacco Use    Smoking status: Former     Current packs/day: 0.00     Average packs/day: 0.3 packs/day for 10.0 years (2.5 ttl pk-yrs)     Types: Cigarettes     Start date: 2000     Quit date: 2010     Years since quittin.4    Smokeless tobacco: Never   Substance and Sexual Activity    Alcohol use: Yes     Comment: socially- maybe 10 drinks a month    Drug use: Not Currently    Sexual activity: Yes     Partners: Male     Review of Systems   Constitutional:  Negative for activity change, appetite change, fever and unexpected weight change.   Respiratory:  Negative for shortness of breath.    Cardiovascular:  Negative for chest pain.   Gastrointestinal:  Negative for abdominal pain, blood in stool, constipation, diarrhea, nausea and vomiting.   Genitourinary:  Positive for pelvic pain (occasional), vaginal bleeding and urinary incontinence (stress). Negative for dysmenorrhea, dyspareunia, dysuria, hematuria, menorrhagia, vaginal discharge, vaginal pain, postcoital bleeding and vaginal odor.   Integumentary:  Negative for breast mass.   Neurological:  Negative for headaches.   Breast: Negative for lump  and mass  Objective:     Vital Signs (Most Recent):  BP: 106/70 (07/30/24 1039) Vital Signs (24h Range):  [unfilled]     Weight: 84 kg (185 lb 3 oz)  Body mass index is 28.16 kg/m².  Patient's last menstrual period was 07/28/2024.    Physical Exam:   Constitutional: She is oriented to person, place, and time. She appears well-developed and well-nourished. No distress.    HENT:   Head: Normocephalic and atraumatic.    Eyes: Conjunctivae and EOM are normal.    Neck: No thyromegaly present.    Cardiovascular:  Normal rate.             Pulmonary/Chest: Effort normal. No respiratory distress. Right breast exhibits no inverted nipple, no mass, no nipple discharge, no skin change, no tenderness, presence, no bleeding and no swelling. Left breast exhibits no inverted nipple, no mass, no nipple discharge, no skin change, no tenderness, presence, no bleeding and no swelling.        Abdominal: Soft. She exhibits no distension. There is no abdominal tenderness.     Genitourinary:    Urethra, vagina, uterus, right adnexa and left adnexa normal.   The external female genitalia was normal.   Cervix is normal.    Genitourinary Comments: Small hemorrhoids. No wart-like lesions apprecaited             Musculoskeletal: Normal range of motion and moves all extremeties. No tenderness or edema.       Neurological: She is alert and oriented to person, place, and time.    Skin: Skin is warm and dry. No rash noted.    Psychiatric: She has a normal mood and affect. Her behavior is normal.           Assessment/Plan:     WWE  - Vaccines utd  - Pap and co test collected  - Mammogram ordered as well as ultrasound.   - record release signed  - AMH ordered  - GC/CT, affirm n/a  - Daily vitamin discussed.  - CBE normal. Physical exam normal. VSS.  - RTC for annual or PRN.      Vijaya Yin MD  Obstetrics & Gynecology  [facility]-OBGYN

## 2024-07-30 NOTE — PATIENT INSTRUCTIONS
Persons nine to 18 years of age: 1,300 mg of calcium, 600 IU of vitamin D  Persons 19 to 50 years of age: 1,000 mg of calcium, 600 IU of vitamin D  Persons 51 to 70 years of age: 1,200 mg of calcium, 600 IU of vitamin D  Persons 71 years and older: 1,200 mg of calcium, 800 IU of vitamin D      Places to get semen analysis or consultation on fertility:  [facility]  [email or web address]  [address] [place] [address]  [phone]  Dr. Anthony    [facility]  [email or web address]  [address], [place] [address]  [phone]   Dr. Jansen

## 2024-07-31 LAB
HPV HR 12 DNA SPEC QL NAA+PROBE: NEGATIVE
HPV16 AG SPEC QL: NEGATIVE
HPV18 DNA SPEC QL NAA+PROBE: NEGATIVE

## 2024-08-01 LAB — MIS SERPL-MCNC: 1.1 NG/ML (ref 0.15–7.5)

## 2024-08-02 LAB
FINAL PATHOLOGIC DIAGNOSIS: ABNORMAL
Lab: ABNORMAL

## 2024-08-05 ENCOUNTER — HOSPITAL ENCOUNTER (OUTPATIENT)
Dept: RADIOLOGY | Facility: OTHER | Age: 39
Discharge: HOME | End: 2024-08-05
Attending: STUDENT IN AN ORGANIZED HEALTH CARE EDUCATION/TRAINING PROGRAM
Payer: COMMERCIAL

## 2024-08-05 DIAGNOSIS — Z12.31 BREAST CANCER SCREENING BY MAMMOGRAM: ICD-10-CM

## 2024-08-05 DIAGNOSIS — Z01.419 WELL WOMAN EXAM WITH ROUTINE GYNECOLOGICAL EXAM: ICD-10-CM

## 2024-08-05 PROCEDURE — 77067 SCR MAMMO BI INCL CAD: CPT | Mod: TC

## 2024-09-23 ENCOUNTER — PATIENT MESSAGE (OUTPATIENT)
Dept: PSYCHIATRY | Facility: CLINIC | Age: 39
End: 2024-09-23
Payer: COMMERCIAL

## 2024-09-23 DIAGNOSIS — F43.10 PTSD (POST-TRAUMATIC STRESS DISORDER): ICD-10-CM

## 2024-09-26 RX ORDER — LORAZEPAM 0.5 MG/1
0.5 TABLET ORAL DAILY PRN
Qty: 30 TABLET | Refills: 0 | Status: SHIPPED | OUTPATIENT
Start: 2024-09-26

## 2024-11-25 RX ORDER — ESCITALOPRAM OXALATE 20 MG/1
20 TABLET ORAL DAILY
Qty: 90 TABLET | Refills: 3 | Status: SHIPPED | OUTPATIENT
Start: 2024-11-25 | End: 2025-11-25

## 2024-12-11 ENCOUNTER — OFFICE VISIT (OUTPATIENT)
Dept: PSYCHIATRY | Facility: CLINIC | Age: 39
End: 2024-12-11
Payer: COMMERCIAL

## 2024-12-11 DIAGNOSIS — F40.10 SOCIAL ANXIETY DISORDER: Primary | ICD-10-CM

## 2024-12-11 DIAGNOSIS — F60.3 BORDERLINE PERSONALITY DISORDER: ICD-10-CM

## 2024-12-11 DIAGNOSIS — F39 MOOD DISORDER: ICD-10-CM

## 2024-12-11 DIAGNOSIS — F43.10 PTSD (POST-TRAUMATIC STRESS DISORDER): ICD-10-CM

## 2024-12-11 PROCEDURE — 99214 OFFICE O/P EST MOD 30 MIN: CPT | Mod: S$GLB,,, | Performed by: STUDENT IN AN ORGANIZED HEALTH CARE EDUCATION/TRAINING PROGRAM

## 2024-12-11 PROCEDURE — 99999 PR PBB SHADOW E&M-EST. PATIENT-LVL I: CPT | Mod: PBBFAC,,, | Performed by: STUDENT IN AN ORGANIZED HEALTH CARE EDUCATION/TRAINING PROGRAM

## 2024-12-11 PROCEDURE — 90833 PSYTX W PT W E/M 30 MIN: CPT | Mod: S$GLB,,, | Performed by: STUDENT IN AN ORGANIZED HEALTH CARE EDUCATION/TRAINING PROGRAM

## 2024-12-11 RX ORDER — ESCITALOPRAM OXALATE 20 MG/1
20 TABLET ORAL DAILY
Qty: 90 TABLET | Refills: 3 | Status: SHIPPED | OUTPATIENT
Start: 2024-12-11 | End: 2025-12-11
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
## 2025-03-05 ENCOUNTER — TELEPHONE (OUTPATIENT)
Dept: PSYCHIATRY | Facility: CLINIC | Age: 40
End: 2025-03-05
Payer: COMMERCIAL

## 2025-03-05 ENCOUNTER — PATIENT MESSAGE (OUTPATIENT)
Dept: PSYCHIATRY | Facility: CLINIC | Age: 40
End: 2025-03-05
Payer: COMMERCIAL

## 2025-04-17 ENCOUNTER — OFFICE VISIT (OUTPATIENT)
Dept: FAMILY MEDICINE | Facility: CLINIC | Age: 40
End: 2025-04-17
Payer: COMMERCIAL

## 2025-04-17 ENCOUNTER — APPOINTMENT (OUTPATIENT)
Dept: RADIOLOGY | Facility: HOSPITAL | Age: 40
End: 2025-04-17
Attending: FAMILY MEDICINE
Payer: COMMERCIAL

## 2025-04-17 VITALS
HEIGHT: 68 IN | RESPIRATION RATE: 19 BRPM | HEART RATE: 91 BPM | WEIGHT: 211 LBS | SYSTOLIC BLOOD PRESSURE: 112 MMHG | OXYGEN SATURATION: 98 % | TEMPERATURE: 98 F | DIASTOLIC BLOOD PRESSURE: 68 MMHG | BODY MASS INDEX: 31.98 KG/M2

## 2025-04-17 DIAGNOSIS — M25.551 RIGHT HIP PAIN: ICD-10-CM

## 2025-04-17 DIAGNOSIS — M25.551 RIGHT HIP PAIN: Primary | ICD-10-CM

## 2025-04-17 PROCEDURE — 1159F MED LIST DOCD IN RCRD: CPT | Mod: CPTII,S$GLB,, | Performed by: FAMILY MEDICINE

## 2025-04-17 PROCEDURE — 73502 X-RAY EXAM HIP UNI 2-3 VIEWS: CPT | Mod: TC,FY,PN,RT

## 2025-04-17 PROCEDURE — 3008F BODY MASS INDEX DOCD: CPT | Mod: CPTII,S$GLB,, | Performed by: FAMILY MEDICINE

## 2025-04-17 PROCEDURE — 3074F SYST BP LT 130 MM HG: CPT | Mod: CPTII,S$GLB,, | Performed by: FAMILY MEDICINE

## 2025-04-17 PROCEDURE — 73502 X-RAY EXAM HIP UNI 2-3 VIEWS: CPT | Mod: 26,RT,, | Performed by: RADIOLOGY

## 2025-04-17 PROCEDURE — 1160F RVW MEDS BY RX/DR IN RCRD: CPT | Mod: CPTII,S$GLB,, | Performed by: FAMILY MEDICINE

## 2025-04-17 PROCEDURE — 99999 PR PBB SHADOW E&M-EST. PATIENT-LVL IV: CPT | Mod: PBBFAC,,, | Performed by: FAMILY MEDICINE

## 2025-04-17 PROCEDURE — 99213 OFFICE O/P EST LOW 20 MIN: CPT | Mod: S$GLB,,, | Performed by: FAMILY MEDICINE

## 2025-04-17 PROCEDURE — 3078F DIAST BP <80 MM HG: CPT | Mod: CPTII,S$GLB,, | Performed by: FAMILY MEDICINE

## 2025-04-17 RX ORDER — DICLOFENAC SODIUM 75 MG/1
75 TABLET, DELAYED RELEASE ORAL 2 TIMES DAILY PRN
Qty: 60 TABLET | Refills: 0 | Status: SHIPPED | OUTPATIENT
Start: 2025-04-17

## 2025-04-19 NOTE — PROGRESS NOTES
Patient Name: Milvia Tuttle    : 1985  MRN: [id number]      Subjective:     Patient ID: Milvia is a 39 y.o. female    Chief Complaint:  Hip Pain    History of Present Illness    Milvia presents today for right hip and side pain    She reports right hip and side pain that has progressively worsened over the past couple months. Pain is described as a dull ache that comes and goes, with sharp jabbing sensations when stepping on the right foot. Pain radiates down to the thigh area and occasionally to the posterior calf, with most intense symptoms localized to the hip region. She reports pain when lying on the affected right side. She experiences morning stiffness that is inconsistent in severity. Pain intensity does not consistently vary between sitting and standing positions. She has been using heated car seats for symptom relief. She denies any falls or injuries to the affected area. She reports fatigue and mild nausea over the past several days.    She has a history of leg issues, including leg cramps and pain, for several years. An ultrasound at [facility] in  to assess blood flow in her legs showed slightly irregular blood flow but was otherwise unremarkable. She experiences significant difficulty ascending stairs, reporting severe leg pain, but denies issues with descending stairs.    She denies any medication allergies.    Review of Systems   Constitutional:  Negative for activity change and unexpected weight change.   HENT:  Negative for hearing loss, rhinorrhea and trouble swallowing.    Eyes:  Negative for discharge and visual disturbance.   Respiratory:  Negative for chest tightness and wheezing.    Cardiovascular:  Negative for chest pain and palpitations.   Gastrointestinal:  Negative for blood in stool, constipation, diarrhea and vomiting.   Endocrine: Negative for polydipsia and polyuria.   Genitourinary:  Negative for difficulty urinating, dysuria, hematuria and menstrual problem.  "  Musculoskeletal:  Positive for arthralgias, joint swelling and neck pain.   Neurological:  Negative for weakness and headaches.   Psychiatric/Behavioral:  Negative for confusion and dysphoric mood.       Objective:   /68 (BP Location: Left arm, Patient Position: Sitting)   Pulse 91   Temp 98 °F (36.7 °C) (Oral)   Resp 19   Ht 5' 8" (1.727 m)   Wt 95.7 kg (210 lb 15.7 oz)   LMP 04/06/2025   SpO2 98%   BMI 32.08 kg/m²     Physical Exam  Pulmonary:      Effort: Pulmonary effort is normal.   Musculoskeletal:      Lumbar back: No tenderness or bony tenderness. Normal range of motion.      Right hip: Tenderness present. Normal range of motion. Normal strength.   Neurological:      Mental Status: She is alert.           Assessment        ICD-10-CM ICD-9-CM   1. Right hip pain  M25.551 719.45         Plan:   Assessment & Plan      RIGHT HIP PAIN:  Noted that the patient is experiencing pain in the right hip area, extending up to the side, described as a dull ache with intermittent jabbing sensation when stepping on the right foot.  Prescribed diclofenac, an anti-inflammatory medication, to be taken up to twice daily with meals.  Ordered an X-ray of the hip to be done during the visit.  Will get sports medicine evaluation.      1. Right hip pain  -     X-Ray Hip 2 or 3 views Right with Pelvis when performed; Future; Expected date: 04/17/2025  -     Ambulatory referral/consult to Sports Medicine; Future; Expected date: 04/24/2025  -     diclofenac (VOLTAREN) 75 MG EC tablet; Take 1 tablet (75 mg total) by mouth 2 (two) times daily as needed (hip pain).  Dispense: 60 tablet; Refill: 0             -Albert Raza Jr., MD, AAHIVS      This note was generated with the assistance of ambient listening technology. Verbal consent was obtained by the patient and accompanying visitor(s) for the recording of patient appointment to facilitate this note. I attest to having reviewed and edited the generated note for " accuracy, though some syntax or spelling errors may persist. Please contact the author of this note for any clarification.      There are no Patient Instructions on file for this visit.      No follow-ups on file.   Future Appointments   Date Time Provider Department Center   4/21/2025  2:30 PM Trisha Canseco PA-C [facility] -    5/12/2025 10:20 AM Albert Raza Jr., MD [facility]   5/13/2025 11:30 AM Leah Mcnkight MD [facility]

## 2025-04-21 ENCOUNTER — OFFICE VISIT (OUTPATIENT)
Dept: ORTHOPEDICS | Facility: CLINIC | Age: 40
End: 2025-04-21
Payer: COMMERCIAL

## 2025-04-21 VITALS — WEIGHT: 211 LBS | HEIGHT: 68 IN | BODY MASS INDEX: 31.98 KG/M2

## 2025-04-21 DIAGNOSIS — M54.50 LOW BACK PAIN, UNSPECIFIED BACK PAIN LATERALITY, UNSPECIFIED CHRONICITY, UNSPECIFIED WHETHER SCIATICA PRESENT: ICD-10-CM

## 2025-04-21 DIAGNOSIS — M70.61 GREATER TROCHANTERIC BURSITIS OF RIGHT HIP: Primary | ICD-10-CM

## 2025-04-21 DIAGNOSIS — M25.551 RIGHT HIP PAIN: ICD-10-CM

## 2025-04-21 PROCEDURE — 3008F BODY MASS INDEX DOCD: CPT | Mod: CPTII,S$GLB,,

## 2025-04-21 PROCEDURE — 1159F MED LIST DOCD IN RCRD: CPT | Mod: CPTII,S$GLB,,

## 2025-04-21 PROCEDURE — 99999 PR PBB SHADOW E&M-EST. PATIENT-LVL IV: CPT | Mod: PBBFAC,,,

## 2025-04-21 PROCEDURE — 1160F RVW MEDS BY RX/DR IN RCRD: CPT | Mod: CPTII,S$GLB,,

## 2025-04-21 PROCEDURE — 99203 OFFICE O/P NEW LOW 30 MIN: CPT | Mod: S$GLB,,,

## 2025-04-21 NOTE — PROGRESS NOTES
NEW PATIENT ORTHOPAEDIC: HIP    PRIMARY CARE PHYSICIAN: Albert Raza Jr., MD   REFERRING PROVIDER: Albert Raza Jr., MD  [address] [facility]  [place] [id number]     ASSESSMENT & PLAN:    Impression:  Right Hip Trochanteric Bursitis  Right hip gluteal tendinitis  Low back pain    Follow Up Plan:  8 weeks     Non operative care:    Milvia Tuttle has physical exam evidence of above and wishes to pursue an non-operative care. I am recommending the following:   Patient presents today with atraumatic right hip pain.  This has been ongoing for about a year now, but has progressively worsened over the last few months.  She feels the pain primarily laterally.  Pain is worse with lying on the right side, rising from a seated position, stairs.  She additionally reports chronic low back pain with associated left-sided lower extremity paresthesias.  She has not previously been evaluated for this.  She has seen a chiropractor regarding this.  She was recently seen by primary care who started her on prescription oral anti-inflammatories.  She has been taking this for a little under a week now, but she does feel that this has helped some.  Does report that she has been evaluated for a lateral based hip pain previously at bone and joint and underwent a steroid injection for this.  This was over 2 years ago.  No previous physical therapy or other treatment modalities.  She additionally reports some hypersensitivity feeling diffusely throughout her legs she is worsened by scratching her skin.  Hip radiographs do not demonstrate any acute fracture, dislocation, or significant degenerative changes.  Joint spaces are well-maintained.  On exam today, she has full painless range of motion.  No pain with IR/ER.  She does have some tenderness to palpation over her greater trochanter extending into the gluteal tendon insertions and musculature.  Very mild pain at iliopsoas tendon.  No pain with resisted hip flexion.  I think her primary  pain complaint today is in regards to hip trochanteric bursitis.  We discussed multiple treatment modalities for this including oral anti-inflammatories, home exercise program versus formal physical therapy, injection therapy.  At this time, she is interested in pursuing physical therapy.  Referral placed for PT and I will plan to see her back in about 8 weeks for re-evaluation.  If her back is more of an issue where she is continuing to experience paresthesias in her lower extremities, we would likely refer to pain management for evaluation.    The patient has been ordered:  Physical Therapy    CONSULTS:   None    ACTIVE PROBLEM LIST  Problem List[1]        SUBJECTIVE    CHIEF COMPLAINT: Hip Pain    Initial visit 4/21/25 HPI:   Milvia Tuttle is a 39 y.o. female here for evaluation and management of right hip pain. There is not a specific incident that brought about this pain. she has had progressive problems with the hip(s) starting 1 years ago but has progressively worsened over the last few months and is progressing which is now interfering with activities which include:  Start-up pain, sleeping on right side, stairs    Currently the pain in the joint is mild with activity.  The pain is intermittent and is located in buttocks, lateral hip, and groin.  The pain is described as stabbing. Relieving factors include rest, prescription medication, over the counter medication , and repositioning. No associated Catching, Clicking, and Popping.     They do not report leg length inequality.     Milvia Tuttle has no additional complaints.     PROGRESSIVE SYMPTOMS:  Pain impacting sleep  Pain effecting living situation    FUNCTIONAL STATUS:   Do light to moderate work around the house  Walk a block or two on level ground   Climb a flight of stairs or walk up a hill   Participate in recreational activities     PREVIOUS TREATMENTS:  Medical: OTC NSAIDS and RX NSAIDS  Physical Therapy: None  Previous Orthopaedic Surgery:   None    REVIEW OF SYSTEMS:  PAIN ASSESSMENT:  See HPI.  MUSCULOSKELETAL: See HPI.  OTHER 10 point review of systems is negative except as stated in HPI above    PAST MEDICAL HISTORY   has a past medical history of Abnormal Pap smear of cervix, Anxiety, Borderline personality disorder, Depression, Migraine with aura, and Vertigo.     PAST SURGICAL HISTORY   has a past surgical history that includes Adenoidectomy; Loop electrosurgical excision procedure (LEEP); anal wart (2012); anal wart  (2022); and Breast biopsy (Left).     FAMILY HISTORY  family history includes Heart disease in her mother. She was adopted.     SOCIAL HISTORY   reports that she quit smoking about 15 years ago. Her smoking use included cigarettes. She started smoking about 25 years ago. She has a 2.5 pack-year smoking history. She has never used smokeless tobacco. She reports current alcohol use. She reports that she does not currently use drugs.     ALLERGIES   Review of patient's allergies indicates:  No Known Allergies     MEDICATIONS   Medications Ordered Prior to Encounter[2]       PHYSICAL EXAM   vitals were not taken for this visit.   There is no height or weight on file to calculate BMI.      All other systems deferred.  GENERAL:  No acute distress  HABITUS: Obese  GAIT: Non-antalgic  SKIN: Normal     HIP EXAM:    right:   ROM:   Flexion: 120 degrees    Internal Rotation: 30 degrees    External Rotation: 30 degrees    Abduction: 45 degrees    Adduction: 20 degrees  No apparent leg length discrepancy    Palpation: Yes tenderness over Greater trochanter and Gluteal insertions   Pain is not reproduced with IR or ER of the hip  Strength: 5/5 hip flexion, abduction, knee flexion and extension   Straight leg raise: Negative   Neurovascular Status: Sensation intact to light touch in Sural, saphenous, SPN, DPN, Tibial nerve distribution  2+ pulse DP/PT, normal capillary refill, foot has normal warmth    DATA:  Diagnostic tests reviewed for today's  visit:     Hip radiographs appears normal. No evidence of fracture, osseous abnormalities, or significant degenerative changes.             [1]   Patient Active Problem List  Diagnosis    RLS (restless legs syndrome)    Social anxiety disorder    Borderline personality disorder   [2]   Current Outpatient Medications on File Prior to Visit   Medication Sig Dispense Refill    diclofenac (VOLTAREN) 75 MG EC tablet Take 1 tablet (75 mg total) by mouth 2 (two) times daily as needed (hip pain). 60 tablet 0    dupilumab (DUPIXENT PEN) 300 mg/2 mL PnIj       EScitalopram oxalate (LEXAPRO) 20 MG tablet Take 1 tablet (20 mg total) by mouth once daily. 90 tablet 3    fluticasone propionate (FLONASE) 50 mcg/actuation nasal spray 1 spray (50 mcg total) by Each Nostril route once daily. 11.1 mL 1    LORazepam (ATIVAN) 0.5 MG tablet Take 1 tablet (0.5 mg total) by mouth daily as needed for Anxiety (anxiety, dysregulation). 30 tablet 0    traZODone (DESYREL) 50 MG tablet Take 2 tablets (100 mg total) by mouth nightly as needed for Insomnia (Take 1-2 pills as needed nightly for sleep). 60 tablet 11    valacyclovir (VALTREX) 1000 MG tablet TK 2 TS PO Q 12 H PRN FOR 1 DAY FOR FEVER BLISTER  1     No current facility-administered medications on file prior to visit.

## 2025-05-21 ENCOUNTER — CLINICAL SUPPORT (OUTPATIENT)
Dept: REHABILITATION | Facility: HOSPITAL | Age: 40
End: 2025-05-21
Payer: COMMERCIAL

## 2025-05-21 DIAGNOSIS — M70.61 GREATER TROCHANTERIC BURSITIS OF RIGHT HIP: ICD-10-CM

## 2025-05-21 DIAGNOSIS — M25.651 IMPAIRED RANGE OF MOTION OF RIGHT HIP: Primary | ICD-10-CM

## 2025-05-21 DIAGNOSIS — R29.898 WEAKNESS OF RIGHT HIP: ICD-10-CM

## 2025-05-21 PROCEDURE — 97110 THERAPEUTIC EXERCISES: CPT | Mod: PN

## 2025-05-21 PROCEDURE — 97140 MANUAL THERAPY 1/> REGIONS: CPT | Mod: PN

## 2025-05-21 PROCEDURE — 97161 PT EVAL LOW COMPLEX 20 MIN: CPT | Mod: PN

## 2025-05-21 NOTE — PROGRESS NOTES
Outpatient Rehab    Physical Therapy Evaluation    Patient Name: Lucy Tuttle  MRN: [id number]  YOB: 1985  Encounter Date: 5/21/2025    Therapy Diagnosis:   Encounter Diagnoses   Name Primary?    Greater trochanteric bursitis of right hip     Impaired range of motion of right hip Yes    Weakness of right hip      Physician: Trisha Canseco PA-C    Physician Orders: Eval and Treat  Medical Diagnosis: Greater trochanteric bursitis of right hip    Visit # / Visits Authorized:  1 / 1  Insurance Authorization Period: 4/21/2025 to 12/31/2025  Date of Evaluation: 5/21/2025  Plan of Care Certification: 5/21/2025 to 8/21/15     Time In: 1300   Time Out: 1400  Total Time (in minutes): 60   Total Billable Time (in minutes): 60    Intake Outcome Measure for FOTO Survey    Therapist reviewed FOTO scores for Lucy Tuttle on 5/21/2025.   FOTO report - see Media section or FOTO account episode details.     Intake Score: 56%    Precautions:       Subjective   History of Present Illness  Lucy is a 39 y.o. female who reports to physical therapy with a chief concern of right hip pain.     The patient reports a medical diagnosis of M70.61 (ICD-10-CM) - Greater trochanteric bursitis of right hip.    Diagnostic tests related to this condition: X-ray.   X-Ray Details: FINDINGS:  No evidence of fracture or dislocation.  No soft tissue abnormality.  No radiopaque foreign body.     Impression:     No evidence of acute fracture.    History of Present Condition/Illness: Patient presents with c/o right hip pain.  She reports an insidious onset 3 months ago that is not improving.  She states x-rays are negative.  She does report 2 years ago she may have been having the same pain.  She does report an injection in the hip 2 years ago with moderate relief. She states she works as a  but also helps with cleaning.  She reports really struggling with stepping. She states massages have aggravated the  problem.  Her goal is for the pain to go away.     Pain     Patient reports a current pain level of 3/10. Pain at best is reported as 2/10. Pain at worst is reported as 8/10.   Clinical Progression (since onset): Stable  Pain-Relieving Factors: Rest  Pain-Aggravating Factors: Stair climbing, Squatting, Kneeling         Living Arrangements  Home Setup  Home Access: Level entry  Number of Levels in Home: One level        Employment  Patient reports: Does the patient's condition impact their ability to work?  Employment Status: Employed full-time         Past Medical History/Physical Systems Review:   Milvia Tuttle  has a past medical history of Abnormal Pap smear of cervix, Anxiety, Borderline personality disorder, Depression, Migraine with aura, and Vertigo.    Milvia Tuttle  has a past surgical history that includes Adenoidectomy; Loop electrosurgical excision procedure (LEEP); anal wart (2012); anal wart  (2022); and Breast biopsy (Left).    Milvia has a current medication list which includes the following prescription(s): diclofenac, dupixent pen, escitalopram oxalate, fluticasone propionate, lorazepam, trazodone, and valacyclovir.    Review of patient's allergies indicates:  No Known Allergies     Objective   Posture                 Pt stands with right hip slightly higher than contralateral left.   LLD: right> left 1cm    Hip Palpation     Mild tenderness right TFL, GT                  Hip Range of Motion   Right Hip   Active (deg) Passive (deg) Pain   Flexion 115 120     Extension 10 15     ABduction 40 45     ADduction 20 25     External Rotation 90/90 40 40     External Rotation Prone         Internal Rotation 90/90 20 20     Internal Rotation Prone             Left Hip   Active (deg) Passive (deg) Pain   Flexion 115 120     Extension 10 15     ABduction 40 45     ADduction 20 25     External Rotation 90/90 45 50     External Rotation Prone         Internal Rotation 90/90 25 30     Internal  Rotation Prone                            Hip Strength - Planes of Motion   Right Strength Right Pain Left Strength Left  Pain   Flexion (L2) 4-   4     Extension 4   4     ABduction 4   4     ADduction 4   4     Internal Rotation 4   4     External Rotation 4-   4                Gait Analysis  Gait Analysis Details  No sign of antalgia or abnormalities with gait.          Treatment:  Therapeutic Exercise  TE 1: IT band stretch, supine with belt 30 sec x 4  TE 2: PPT x 20  TE 3: TrA recruitment 2 x 10  TE 4: piriformis stretch 20 sec x 4  Manual Therapy  MT 1: STM to IT Band with roller stick  MT 2: MET right innominate posterior      Time Entry(in minutes):  PT Evaluation (Low) Time Entry: 30  Manual Therapy Time Entry: 15  Therapeutic Exercise Time Entry: 15    Assessment & Plan   Assessment  Lucy presents with a condition of Low complexity.   Presentation of Symptoms: Stable       Functional Limitations: Ambulating on uneven surfaces, Gait limitations, Participating in leisure activities, Performing household chores, Range of motion, Squatting, Functional mobility  Impairments: Impaired physical strength, Impaired balance, Activity intolerance    Prognosis: Good  Assessment Details: Pt presents with signs and symptoms consistent with referring diagnosis. Evaluation has determined a decrease in functional status and subjective and objective deficits that can be addressed by physical therapy intervention. Pt demonstrates pain limiting functional activities. Decreased flexibility and strength limiting normal movement patterns. Decreased postural strength and awareness. . Decreased participation in functional and recreational activities. Subjective and objective measures are addressed by goals in the plan of care.  Patient/family are involved in the development of these goals. Patient/family are educated about current injury and treatment. Pt demonstrates no additional cultural, spiritual or educational need and  currently has no barriers to learning. Pt responded well to treatment today. Pt is a good candidate for skilled physical therapy intervention and has a good prognosis and is motivated to return to functional and  recreational activities.    Plan  From a physical therapy perspective, the patient would benefit from: Skilled Rehab Services    Planned therapy interventions include: Therapeutic exercise, Therapeutic activities, Neuromuscular re-education, and Manual therapy.    Planned modalities to include: Cryotherapy (cold pack) and Thermotherapy (hot pack).        Visit Frequency: 2 times Per Week for 12 Weeks.       This plan was discussed with Patient.   Discussion participants: Agreed Upon Plan of Care             Patient's spiritual, cultural, and educational needs considered and patient agreeable to plan of care and goals.     Education  Education was done with Patient. The patient's learning style includes Demonstration. The patient Demonstrates understanding.                 Goals:   Active       Physical Therapy       Physical Therapy Goal       Start:  05/21/25    Expected End:  08/13/25       Short Term Goals (4 Weeks):     1.Pt to increase strength by a 1/2 grade of muscles test to allow for improvement in functional activities such as performing chores.  2.Pt to improve range of motion by 25% to allow for improved functional mobility to allow for improvement in IADL's.   3.Pt to report compliance with HEP and demonstrate proper exercise technique to PT to show competence with self management of condition.  4.Decrease pain by 25% during functional activities.    Long Term Goals (12 Weeks):     1. Increase ROM to allow improved joint biomechanics during functional activities.   2.Increase trunk and lower extremity strength to within normal limits during functional activities.   3. Independent with home exercise program.   4. Full return to functional activities with manageable complaints.  5. Patient to  demonstrate improved posture and body mechanics.  6. Decrease pain by 75% during functional activities.                           Roger Freeman, PT

## 2025-05-26 ENCOUNTER — CLINICAL SUPPORT (OUTPATIENT)
Dept: REHABILITATION | Facility: HOSPITAL | Age: 40
End: 2025-05-26
Payer: COMMERCIAL

## 2025-05-26 DIAGNOSIS — R29.898 WEAKNESS OF RIGHT HIP: ICD-10-CM

## 2025-05-26 DIAGNOSIS — M25.651 IMPAIRED RANGE OF MOTION OF RIGHT HIP: Primary | ICD-10-CM

## 2025-05-26 PROCEDURE — 97140 MANUAL THERAPY 1/> REGIONS: CPT | Mod: PN

## 2025-05-26 PROCEDURE — 97110 THERAPEUTIC EXERCISES: CPT | Mod: PN

## 2025-05-26 NOTE — PROGRESS NOTES
Outpatient Rehab    Physical Therapy Visit    Patient Name: Lucy Tuttle  MRN: [id number]  YOB: 1985  Encounter Date: 5/26/2025    Therapy Diagnosis:   Encounter Diagnoses   Name Primary?    Impaired range of motion of right hip Yes    Weakness of right hip      Physician: Trisha Canseco PA-C    Physician Orders: Eval and Treat  Medical Diagnosis: Greater trochanteric bursitis of right hip    Visit # / Visits Authorized:  1 / 19  Insurance Authorization Period: 5/21/2025 to 12/31/2025  Date of Evaluation: 5/21/2025  Plan of Care Certification: 5/21/2025 to 8/13/2025      PT/PTA:     Number of PTA visits since last PT visit:   Time In: 0920   Time Out: 1015  Total Time (in minutes): 55   Total Billable Time (in minutes): 53    FOTO:  Intake Score:  %  Survey Score 2:  %  Survey Score 3:  %    Precautions:       Subjective   Pt reports no significant changes since initial visit..  Pain reported as 3/10.      Objective            Treatment:  Therapeutic Exercise  TE 1: IT band stretch, supine with belt 30 sec x 4  TE 2: PPT x 20  TE 3: TrA recruitment 2 x 10  TE 4: piriformis stretch 20 sec x 4  TE 5: bridge 3 x 10  TE 6: prone hip ext 3 x 10  Manual Therapy  MT 1: STM to IT Band with roller stick  MT 2: MET right innominate posterior  MT 3: long arc hip distraction grade 2      Time Entry(in minutes):  Manual Therapy Time Entry: 15  Therapeutic Exercise Time Entry: 38    Assessment & Plan   Assessment: Pt presents ambulating with a slight antalgic gait, decreased stance on involved right LE. initiated trunk and hip stabilization exercises with good performance. Mild c/o increased discomfor with prescribed exercises. Pt is progressing well towards established goals.  Evaluation/Treatment Tolerance: Patient tolerated treatment well    The patient will continue to benefit from skilled outpatient physical therapy in order to address the deficits listed in the problem list on the initial evaluation,  provide patient and family education, and maximize the patients level of independence in the home and community environments.     The patient's spiritual, cultural, and educational needs were considered, and the patient is agreeable to the plan of care and goals.     Education  Education was done with Patient. The patient's learning style includes Demonstration. The patient Demonstrates understanding.                 Plan: Will continue per PT plan of care.    Goals:   Active       Physical Therapy       Physical Therapy Goal       Start:  05/21/25    Expected End:  08/13/25       Short Term Goals (4 Weeks):     1.Pt to increase strength by a 1/2 grade of muscles test to allow for improvement in functional activities such as performing chores.  2.Pt to improve range of motion by 25% to allow for improved functional mobility to allow for improvement in IADL's.   3.Pt to report compliance with HEP and demonstrate proper exercise technique to PT to show competence with self management of condition.  4.Decrease pain by 25% during functional activities.    Long Term Goals (12 Weeks):     1. Increase ROM to allow improved joint biomechanics during functional activities.   2.Increase trunk and lower extremity strength to within normal limits during functional activities.   3. Independent with home exercise program.   4. Full return to functional activities with manageable complaints.  5. Patient to demonstrate improved posture and body mechanics.  6. Decrease pain by 75% during functional activities.                           Roger Fremean, PT

## 2025-06-02 ENCOUNTER — CLINICAL SUPPORT (OUTPATIENT)
Dept: REHABILITATION | Facility: HOSPITAL | Age: 40
End: 2025-06-02
Payer: COMMERCIAL

## 2025-06-02 DIAGNOSIS — R29.898 WEAKNESS OF RIGHT HIP: ICD-10-CM

## 2025-06-02 DIAGNOSIS — M25.651 IMPAIRED RANGE OF MOTION OF RIGHT HIP: Primary | ICD-10-CM

## 2025-06-02 PROCEDURE — 97140 MANUAL THERAPY 1/> REGIONS: CPT | Mod: PN

## 2025-06-02 PROCEDURE — 97110 THERAPEUTIC EXERCISES: CPT | Mod: PN

## 2025-06-03 ENCOUNTER — OFFICE VISIT (OUTPATIENT)
Dept: PSYCHIATRY | Facility: CLINIC | Age: 40
End: 2025-06-03
Payer: COMMERCIAL

## 2025-06-03 DIAGNOSIS — F43.10 PTSD (POST-TRAUMATIC STRESS DISORDER): Primary | ICD-10-CM

## 2025-06-03 DIAGNOSIS — F40.10 SOCIAL ANXIETY DISORDER: ICD-10-CM

## 2025-06-03 DIAGNOSIS — F60.3 BORDERLINE PERSONALITY DISORDER: ICD-10-CM

## 2025-06-03 PROCEDURE — 98006 SYNCH AUDIO-VIDEO EST MOD 30: CPT | Mod: 95,,, | Performed by: STUDENT IN AN ORGANIZED HEALTH CARE EDUCATION/TRAINING PROGRAM

## 2025-06-03 RX ORDER — BUSPIRONE HYDROCHLORIDE 7.5 MG/1
7.5 TABLET ORAL 2 TIMES DAILY
Qty: 60 TABLET | Refills: 11 | Status: SHIPPED | OUTPATIENT
Start: 2025-06-03 | End: 2026-06-03

## 2025-06-03 RX ORDER — LORAZEPAM 0.5 MG/1
0.5 TABLET ORAL DAILY PRN
Qty: 30 TABLET | Refills: 0 | Status: SHIPPED | OUTPATIENT
Start: 2025-06-03

## 2025-06-03 RX ORDER — ESCITALOPRAM OXALATE 20 MG/1
20 TABLET ORAL DAILY
Qty: 90 TABLET | Refills: 3 | Status: SHIPPED | OUTPATIENT
Start: 2025-06-03 | End: 2026-06-03
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
## 2025-06-15 ENCOUNTER — PATIENT MESSAGE (OUTPATIENT)
Dept: ORTHOPEDICS | Facility: CLINIC | Age: 40
End: 2025-06-15
Payer: COMMERCIAL

## 2025-06-17 ENCOUNTER — OFFICE VISIT (OUTPATIENT)
Dept: ORTHOPEDICS | Facility: CLINIC | Age: 40
End: 2025-06-17
Payer: COMMERCIAL

## 2025-06-17 VITALS — BODY MASS INDEX: 31.98 KG/M2 | HEIGHT: 68 IN | WEIGHT: 211 LBS

## 2025-06-17 DIAGNOSIS — M70.61 GREATER TROCHANTERIC BURSITIS OF RIGHT HIP: ICD-10-CM

## 2025-06-17 DIAGNOSIS — M25.551 RIGHT HIP PAIN: Primary | ICD-10-CM

## 2025-06-17 DIAGNOSIS — M54.50 LOW BACK PAIN, UNSPECIFIED BACK PAIN LATERALITY, UNSPECIFIED CHRONICITY, UNSPECIFIED WHETHER SCIATICA PRESENT: ICD-10-CM

## 2025-06-17 PROCEDURE — 3008F BODY MASS INDEX DOCD: CPT | Mod: CPTII,S$GLB,,

## 2025-06-17 PROCEDURE — 1159F MED LIST DOCD IN RCRD: CPT | Mod: CPTII,S$GLB,,

## 2025-06-17 PROCEDURE — 99999 PR PBB SHADOW E&M-EST. PATIENT-LVL III: CPT | Mod: PBBFAC,,,

## 2025-06-17 PROCEDURE — 99214 OFFICE O/P EST MOD 30 MIN: CPT | Mod: S$GLB,,,

## 2025-06-17 NOTE — PROGRESS NOTES
Established orthopedic patient, follow-up visit for hip, new problem left upper extremity    PRIMARY CARE PHYSICIAN: Albert Raza Jr., MD   REFERRING PROVIDER: No referring provider defined for this encounter.     ASSESSMENT & PLAN:    Impression:  Right Hip Trochanteric Bursitis  Right hip gluteal tendinitis  Low back pain    Left upper extremity paresthesias    Follow Up Plan:  As needed    Non operative care:    Milvia Tuttle has physical exam evidence of above and wishes to pursue an non-operative care. I am recommending the following:     Patient presents initially with atraumatic right hip pain.  This has been ongoing for about a year now, but has progressively worsened over the last few months.  She feels the pain primarily laterally.  Pain is worse with lying on the right side, rising from a seated position, stairs.  She additionally reports chronic low back pain with associated left-sided lower extremity paresthesias.  She has not previously been evaluated for this.  She has seen a chiropractor regarding this.  She was recently seen by primary care who started her on prescription oral anti-inflammatories.  She has been taking this for a little under a week now, but she does feel that this has helped some.  Does report that she has been evaluated for a lateral based hip pain previously at [facility] and underwent a steroid injection for this.  This was over 2 years ago.  No previous physical therapy or other treatment modalities.  She additionally reports some hypersensitivity feeling diffusely throughout her legs she is worsened by scratching her skin.  Hip radiographs do not demonstrate any acute fracture, dislocation, or significant degenerative changes.  Joint spaces are well-maintained.  On exam today, she has full painless range of motion.  No pain with IR/ER.  She does have some tenderness to palpation over her greater trochanter extending into the gluteal tendon insertions and musculature.   Very mild pain at iliopsoas tendon.  No pain with resisted hip flexion.  I think her primary pain complaint today is in regards to hip trochanteric bursitis.  We discussed multiple treatment modalities for this including oral anti-inflammatories, home exercise program versus formal physical therapy, injection therapy.  At this time, she is interested in pursuing physical therapy.  Referral placed for PT and I will plan to see her back in about 8 weeks for re-evaluation.  Patient returns today for 8 week follow up of right hip pain. She has tried PT for 4 weeks and has not noticed much benefit. She is still hesitant to trial steroid injection for greater trochanteric bursitis, as she has only gotten fleeting relief from this in the past. She reports continued shooting pain in her RLE which radiates from posterior hip down distally to foot. This is particularly bothersome at night. She would like to pursue further investigation of low back pain/buttocks pain with associated paresthesias with pain management referral.     Of note, she additionally reports intermittent shooting pain from anterior shoulder down to hand which has occurred intermittently for years. This happens at rest with no specific inciting event. This happens a few times a month. Sought treatment for this a few years ago and underwent PT without significant relief. No updated shoulder xrays obtained today. However, she has a benign shoulder physical exam.  No pain with shoulder range of motion.  Good strength on cuff testing.  No pain reproducible with special tests.  This pain does not appear to be related to her shoulder.      The patient has been ordered:  None    CONSULTS:   Pain management referral    ACTIVE PROBLEM LIST  Problem List[1]        SUBJECTIVE    CHIEF COMPLAINT: Hip Pain    Initial visit 4/21/25 HPI:   Milvia Tuttle is a 39 y.o. female here for evaluation and management of right hip pain. There is not a specific incident that  brought about this pain. she has had progressive problems with the hip(s) starting 1 years ago but has progressively worsened over the last few months and is progressing which is now interfering with activities which include:  Start-up pain, sleeping on right side, stairs    Currently the pain in the joint is mild with activity.  The pain is intermittent and is located in buttocks, lateral hip, and groin.  The pain is described as stabbing. Relieving factors include rest, prescription medication, over the counter medication , and repositioning. No associated Catching, Clicking, and Popping.     They do not report leg length inequality.     Interval history 06/17/2025:  Patient returns today for a week follow up of right hip pain. Started outpatient physical therapy.  Reports that she has not noticed significant relief with consistent sessions of physical therapy.  Pain is primarily still located to the lateral aspect of the hip and posteriorly into gluteal musculature.  Additionally reports intermittent low back pain and lower extremity paresthesias.    Milvia Tuttle has no additional complaints.     PROGRESSIVE SYMPTOMS:  Pain impacting sleep  Pain effecting living situation    FUNCTIONAL STATUS:   Do light to moderate work around the house  Walk a block or two on level ground   Climb a flight of stairs or walk up a hill   Participate in recreational activities     PREVIOUS TREATMENTS:  Medical: OTC NSAIDS and RX NSAIDS  Physical Therapy: None  Previous Orthopaedic Surgery:  None    REVIEW OF SYSTEMS:  PAIN ASSESSMENT:  See HPI.  MUSCULOSKELETAL: See HPI.  OTHER 10 point review of systems is negative except as stated in HPI above    PAST MEDICAL HISTORY   has a past medical history of Abnormal Pap smear of cervix, Anxiety, Borderline personality disorder, Depression, Migraine with aura, and Vertigo.     PAST SURGICAL HISTORY   has a past surgical history that includes Adenoidectomy; Loop electrosurgical excision  "procedure (LEEP); anal wart (2012); anal wart  (2022); and Breast biopsy (Left).     FAMILY HISTORY  family history includes Heart disease in her mother. She was adopted.     SOCIAL HISTORY   reports that she quit smoking about 15 years ago. Her smoking use included cigarettes. She started smoking about 25 years ago. She has a 2.5 pack-year smoking history. She has never used smokeless tobacco. She reports current alcohol use. She reports that she does not currently use drugs.     ALLERGIES   Review of patient's allergies indicates:  No Known Allergies     MEDICATIONS   Medications Ordered Prior to Encounter[2]       PHYSICAL EXAM   height is 5' 8" (1.727 m) and weight is 95.7 kg (210 lb 15.7 oz).   Body mass index is 32.08 kg/m².      All other systems deferred.  GENERAL:  No acute distress  HABITUS: Obese  GAIT: Non-antalgic  SKIN: Normal     HIP EXAM:    right:   ROM:   Flexion: 120 degrees    Internal Rotation: 30 degrees    External Rotation: 30 degrees    Abduction: 45 degrees    Adduction: 20 degrees  No apparent leg length discrepancy    Palpation: Yes tenderness over Greater trochanter and Gluteal insertions   Pain is not reproduced with IR or ER of the hip  Strength: 5/5 hip flexion, abduction, knee flexion and extension   Straight leg raise: Negative   Neurovascular Status: Sensation intact to light touch in Sural, saphenous, SPN, DPN, Tibial nerve distribution  2+ pulse DP/PT, normal capillary refill, foot has normal warmth    SHOULDER EXAM:    Shoulder Range of Motion    Right     Left   (Active/Passive)       Forward Elevation     170/170             170/170   External rotation (arm at side)  50/50             50/50    Internal rotation behind the back  T12             T12     Range of motion is not painful     Scapular winging neg  Scapular dyskinesia neg    Acromioclavicular joint is not tender  Crossbody test: negative    Neer's negative  Hawkin's negative    Hang's negative  Drop arm " negative  Belly press negative  Liftoff negative      Cuff Strength     Right     Left   Supraspinatus        5/5    5/5  Infraspinatus     5/5    5/5  Subscapularis     5/5    5/5    Deltoid testing            5/5    5/5    Hernandez's test negative  Speeds negative  Yergasons negative    Elbow examination demonstrates no tenderness to palpation and has normal range of motion.     ltsi C5-T1  + epl, io, fds, fdp   2+ RP     DATA:  Diagnostic tests reviewed for today's visit:     Hip radiographs appears normal. No evidence of fracture, osseous abnormalities, or significant degenerative changes.               [1]   Patient Active Problem List  Diagnosis    RLS (restless legs syndrome)    Social anxiety disorder    Borderline personality disorder    Impaired range of motion of right hip    Weakness of right hip   [2]   Current Outpatient Medications on File Prior to Visit   Medication Sig Dispense Refill    busPIRone (BUSPAR) 7.5 MG tablet Take 1 tablet (7.5 mg total) by mouth 2 (two) times a day. 60 tablet 11    diclofenac (VOLTAREN) 75 MG EC tablet Take 1 tablet (75 mg total) by mouth 2 (two) times daily as needed (hip pain). 60 tablet 0    dupilumab (DUPIXENT PEN) 300 mg/2 mL PnIj       EScitalopram oxalate (LEXAPRO) 20 MG tablet Take 1 tablet (20 mg total) by mouth once daily. 90 tablet 3    fluticasone propionate (FLONASE) 50 mcg/actuation nasal spray 1 spray (50 mcg total) by Each Nostril route once daily. 11.1 mL 1    LORazepam (ATIVAN) 0.5 MG tablet Take 1 tablet (0.5 mg total) by mouth daily as needed for Anxiety (anxiety, dysregulation). 30 tablet 0    valacyclovir (VALTREX) 1000 MG tablet TK 2 TS PO Q 12 H PRN FOR 1 DAY FOR FEVER BLISTER  1     No current facility-administered medications on file prior to visit.

## 2025-06-25 ENCOUNTER — OFFICE VISIT (OUTPATIENT)
Dept: PAIN MEDICINE | Facility: CLINIC | Age: 40
End: 2025-06-25
Payer: COMMERCIAL

## 2025-06-25 VITALS
SYSTOLIC BLOOD PRESSURE: 107 MMHG | OXYGEN SATURATION: 99 % | RESPIRATION RATE: 18 BRPM | BODY MASS INDEX: 30.64 KG/M2 | DIASTOLIC BLOOD PRESSURE: 66 MMHG | HEART RATE: 80 BPM | WEIGHT: 201.5 LBS

## 2025-06-25 DIAGNOSIS — M25.512 CHRONIC LEFT SHOULDER PAIN: ICD-10-CM

## 2025-06-25 DIAGNOSIS — G89.29 CHRONIC LEFT SHOULDER PAIN: ICD-10-CM

## 2025-06-25 DIAGNOSIS — M54.50 LOW BACK PAIN, UNSPECIFIED BACK PAIN LATERALITY, UNSPECIFIED CHRONICITY, UNSPECIFIED WHETHER SCIATICA PRESENT: ICD-10-CM

## 2025-06-25 DIAGNOSIS — M89.8X8 ILIAC CREST BONE PAIN: Primary | ICD-10-CM

## 2025-06-25 PROCEDURE — 3074F SYST BP LT 130 MM HG: CPT | Mod: CPTII,S$GLB,, | Performed by: PAIN MEDICINE

## 2025-06-25 PROCEDURE — 1159F MED LIST DOCD IN RCRD: CPT | Mod: CPTII,S$GLB,, | Performed by: PAIN MEDICINE

## 2025-06-25 PROCEDURE — 1160F RVW MEDS BY RX/DR IN RCRD: CPT | Mod: CPTII,S$GLB,, | Performed by: PAIN MEDICINE

## 2025-06-25 PROCEDURE — 99204 OFFICE O/P NEW MOD 45 MIN: CPT | Mod: S$GLB,,, | Performed by: PAIN MEDICINE

## 2025-06-25 PROCEDURE — 3078F DIAST BP <80 MM HG: CPT | Mod: CPTII,S$GLB,, | Performed by: PAIN MEDICINE

## 2025-06-25 PROCEDURE — 99999 PR PBB SHADOW E&M-EST. PATIENT-LVL IV: CPT | Mod: PBBFAC,,, | Performed by: PAIN MEDICINE

## 2025-06-25 PROCEDURE — 3008F BODY MASS INDEX DOCD: CPT | Mod: CPTII,S$GLB,, | Performed by: PAIN MEDICINE

## 2025-06-25 NOTE — PROGRESS NOTES
Subjective:     Patient ID: Milvia Tuttle is a 39 y.o. female    Chief Complaint: Hip Pain (Right sided deep sharp stabbing pain) and Shoulder Pain (Left sided shocking pain)      Referred by: Trisha Canseco PA-C      HPI:    Initial Encounter (6/25/25):  Milvia presents with right hip pain ongoing for approximately 6 months. The pain is deep and radiating along her right side.  Pain seems to be focused more along the anterior iliac crest/spine.  It will radiate to the lateral hip region.  She does have some pain in the right buttock/lumbosacral region as well. She describes it as very painful and deep, stating it feels like stretching and pulling apart when she gets up. Pain is constant and worsens with certain activities, particularly when getting out of a car after driving, during the first few steps, and when she winds down at night.    She has attempted various treatments, including chiropractic care, massages, and PT. She completed three out of four scheduled PT sessions but reported that it increased her pain slightly. She was prescribed diclofenac but did not take it consistently and did not notice significant improvement when she did take it. About two years ago, she received a steroid injection at a bone and joint clinic for right hip pain, which provided temporary relief. The current pain is described as different and more intense than the previous episode.    She denies any numbness, tingling, or weakness in her leg associated with the pain. She also denies urinary or bowel incontinence related to the pain.    She reports a long-standing issue with her left shoulder. She recalls a sudden onset of sharp, intense pain while sitting in bed, describing it as extremely sharp pain that took her breath away. This shoulder pain occurs intermittently, lasting anywhere from 5 to 30 seconds, and can be triggered by various activities or positions. The shoulder feels somewhat numb but also painful. She has  sought relief through massages in the past.    Physical Therapy:  Yes.  Somewhat exacerbated pain.    Non-pharmacologic Treatment:  Rest helps         TENS?  No    Pain Medications:         Currently taking:  Nothing    Has tried in the past:  Diclofenac    Has not tried:  Opioids, Tylenol, Muscle relaxants, TCAs, SNRIs, anticonvulsants, topical creams    Blood thinners:  None    Interventional Therapies:  None    Relevant Surgeries:  None    Affecting sleep?  Yes    Affecting daily activities? yes    Depressive symptoms? No          SI/HI? No    Work status: Employed    Pain Scores:    Best:       3/10  Worst:     10/10  Usually:   5/10  Today:    5/10    Pain Disability Index  Family/Home Responsibilities:: 0  Recreation:: 0  Social Activity:: 0  Occupation:: 0  Sexual Behavior:: 0  Self Care:: 0  Life-Support Activities:: 0  Pain Disability Index (PDI): 0    Review of Systems   Constitutional:  Negative for activity change, appetite change, chills, fatigue, fever and unexpected weight change.   HENT:  Negative for hearing loss.    Eyes:  Negative for visual disturbance.   Respiratory:  Negative for chest tightness and shortness of breath.    Cardiovascular:  Negative for chest pain.   Gastrointestinal:  Negative for abdominal pain, constipation, diarrhea, nausea and vomiting.   Genitourinary:  Negative for difficulty urinating.   Musculoskeletal:  Positive for arthralgias, back pain, gait problem, myalgias and neck pain.   Skin:  Negative for rash.   Neurological:  Negative for dizziness, weakness, light-headedness, numbness and headaches.   Psychiatric/Behavioral:  Positive for sleep disturbance. Negative for hallucinations and suicidal ideas. The patient is not nervous/anxious.        Past Medical History:   Diagnosis Date    Abnormal Pap smear of cervix     s/p leep procedure    Anxiety     Borderline personality disorder     Depression     Migraine with aura     Vertigo        Past Surgical History:    Procedure Laterality Date    ADENOIDECTOMY      anal wart  2012    it was cancerous removed then    anal wart   2022    BREAST BIOPSY Left     Core bx, x2, benign    LOOP ELECTROSURGICAL EXCISION PROCEDURE (LEEP)         Social History[1]    Review of patient's allergies indicates:  No Known Allergies    Medications Ordered Prior to Encounter[2]    Objective:      /66 (BP Location: Right arm, Patient Position: Sitting)   Pulse 80   Resp 18   Wt 91.4 kg (201 lb 8 oz)   LMP 06/24/2025 (Exact Date)   SpO2 99%   BMI 30.64 kg/m²     Exam:  GEN:  Well developed, well nourished.  No acute distress.   HEENT:  No trauma.  Mucous membranes moist.  Nares patent bilaterally.  PSYCH: Normal affect. Thought content appropriate.  CHEST:  Breathing symmetric.  No audible wheezing.  ABD: Soft, non-distended.  SKIN:  Warm, pink, dry.  No rash on exposed areas.    EXT:  No cyanosis, clubbing, or edema.  No color change or changes in nail or hair growth.  NEURO/MUSCULOSKELETAL:  Fully alert, oriented, and appropriate. Speech normal love. No cranial nerve deficits.   Gait:  Normal.  No focal motor deficits.     Tender palpation at the anterior superior iliac spine and along the iliac crest  No tenderness to palpation of the right lower quadrant   No tenderness to palpation the right greater trochanter  Full active range of motion of left shoulder without pain   Full active range of motion of cervical spine without pain      Imaging:    Narrative & Impression    EXAMINATION:  XR HIP WITH PELVIS WHEN PERFORMED 2 OR 3 VIEWS RIGHT     CLINICAL HISTORY:  Pain in right hip     TECHNIQUE:  AP view of the pelvis and frog leg lateral view of the right hip were performed.     COMPARISON:  None     FINDINGS:  No evidence of fracture or dislocation.  No soft tissue abnormality.  No radiopaque foreign body.     Impression:     No evidence of acute fracture.        Electronically signed by:Steffen Orozco MD  Date:                    "                         04/17/2025  Time:                                           12:27       Assessment:       Encounter Diagnoses   Name Primary?    Low back pain, unspecified back pain laterality, unspecified chronicity, unspecified whether sciatica present     Iliac crest bone pain Yes    Chronic left shoulder pain      Plan:       Milvia Hollis" was seen today for hip pain and shoulder pain.    Diagnoses and all orders for this visit:    Iliac crest bone pain    Low back pain, unspecified back pain laterality, unspecified chronicity, unspecified whether sciatica present  -     Ambulatory referral/consult to Pain Clinic    Chronic left shoulder pain        Milvia Tuttle is a 39 y.o. female with chronic right anterior hip/pelvis pain.  At this time, pain seems to be mostly related to enthesopathy along the anterior superior iliac spine and iliac crest.  Not having signs or symptoms of intra-articular hip pathology or overt lumbar pathology.  Also with chronic intermittent neck and left shoulder pain.  Normal on examination today.    Pertinent imaging studies reviewed by me. Imaging results were discussed with patient.  Advised her to use heat to the anterior hip/pelvis to see if this provides any pain relief.  Advised patient to use Voltaren gel to right anterior hip/pelvis.    May consider ultrasound-guided injections along the iliac crest/anterior superior iliac spine.  Return to clinic as needed.          This note was created by combination of typed  and M-Modal dictation. Transcription and phonetic errors may be present.  If there are any questions, please contact me.             [1]   Social History  Socioeconomic History    Marital status:    Occupational History    Occupation: administration   Tobacco Use    Smoking status: Former     Current packs/day: 0.00     Average packs/day: 0.3 packs/day for 10.0 years (2.5 ttl pk-yrs)     Types: Cigarettes     Start date: 2/14/2000     Quit " date: 2/14/2010     Years since quitting: 15.3    Smokeless tobacco: Never   Substance and Sexual Activity    Alcohol use: Yes     Comment: socially- maybe 10 drinks a month    Drug use: Not Currently    Sexual activity: Yes     Partners: Male   Social History Narrative    From [place]    Moved to [facility] in 2005    Not getting reg exercise     Social Drivers of Health     Financial Resource Strain: Low Risk  (4/16/2025)    Overall Financial Resource Strain (CARDIA)     Difficulty of Paying Living Expenses: Not very hard   Food Insecurity: No Food Insecurity (4/16/2025)    Hunger Vital Sign     Worried About Running Out of Food in the Last Year: Never true     Ran Out of Food in the Last Year: Never true   Transportation Needs: No Transportation Needs (4/16/2025)    PRAPARE - Transportation     Lack of Transportation (Medical): No     Lack of Transportation (Non-Medical): No   Physical Activity: Unknown (4/16/2025)    Exercise Vital Sign     Days of Exercise per Week: 2 days   Stress: Stress Concern Present (4/16/2025)    [employer] - Occupational Stress Questionnaire     Feeling of Stress : To some extent   Housing Stability: Low Risk  (4/16/2025)    Housing Stability Vital Sign     Unable to Pay for Housing in the Last Year: No     Number of Times Moved in the Last Year: 0     Homeless in the Last Year: No   [2]   Current Outpatient Medications on File Prior to Visit   Medication Sig Dispense Refill    busPIRone (BUSPAR) 7.5 MG tablet Take 1 tablet (7.5 mg total) by mouth 2 (two) times a day. 60 tablet 11    diclofenac (VOLTAREN) 75 MG EC tablet Take 1 tablet (75 mg total) by mouth 2 (two) times daily as needed (hip pain). 60 tablet 0    dupilumab (DUPIXENT PEN) 300 mg/2 mL PnIj       EScitalopram oxalate (LEXAPRO) 20 MG tablet Take 1 tablet (20 mg total) by mouth once daily. 90 tablet 3    fluticasone propionate (FLONASE) 50 mcg/actuation nasal spray 1 spray (50 mcg total) by Each  Nostril route once daily. 11.1 mL 1    LORazepam (ATIVAN) 0.5 MG tablet Take 1 tablet (0.5 mg total) by mouth daily as needed for Anxiety (anxiety, dysregulation). 30 tablet 0    valacyclovir (VALTREX) 1000 MG tablet TK 2 TS PO Q 12 H PRN FOR 1 DAY FOR FEVER BLISTER  1     No current facility-administered medications on file prior to visit.

## 2025-07-15 ENCOUNTER — OFFICE VISIT (OUTPATIENT)
Dept: PSYCHIATRY | Facility: CLINIC | Age: 40
End: 2025-07-15
Payer: COMMERCIAL

## 2025-07-15 DIAGNOSIS — F40.10 SOCIAL ANXIETY DISORDER: ICD-10-CM

## 2025-07-15 DIAGNOSIS — F60.3 BORDERLINE PERSONALITY DISORDER: ICD-10-CM

## 2025-07-15 DIAGNOSIS — F43.10 PTSD (POST-TRAUMATIC STRESS DISORDER): Primary | ICD-10-CM

## 2025-07-15 DIAGNOSIS — F39 MOOD DISORDER: ICD-10-CM

## 2025-07-15 RX ORDER — ESCITALOPRAM OXALATE 20 MG/1
20 TABLET ORAL DAILY
Qty: 90 TABLET | Refills: 3 | Status: SHIPPED | OUTPATIENT
Start: 2025-07-15 | End: 2026-07-15
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
## 2025-08-01 ENCOUNTER — TELEPHONE (OUTPATIENT)
Dept: FAMILY MEDICINE | Facility: CLINIC | Age: 40
End: 2025-08-01
Payer: COMMERCIAL

## 2025-08-27 DIAGNOSIS — Z12.31 OTHER SCREENING MAMMOGRAM: ICD-10-CM
